# Patient Record
Sex: FEMALE | Race: WHITE | NOT HISPANIC OR LATINO | Employment: FULL TIME | URBAN - METROPOLITAN AREA
[De-identification: names, ages, dates, MRNs, and addresses within clinical notes are randomized per-mention and may not be internally consistent; named-entity substitution may affect disease eponyms.]

---

## 2020-07-21 ENCOUNTER — LAB VISIT (OUTPATIENT)
Dept: LAB | Facility: OTHER | Age: 45
End: 2020-07-21
Payer: OTHER GOVERNMENT

## 2020-07-21 DIAGNOSIS — Z20.822 SUSPECTED COVID-19 VIRUS INFECTION: ICD-10-CM

## 2020-07-21 DIAGNOSIS — Z03.818 ENCOUNTER FOR OBSERVATION FOR SUSPECTED EXPOSURE TO OTHER BIOLOGICAL AGENTS RULED OUT: ICD-10-CM

## 2020-07-21 PROCEDURE — U0003 INFECTIOUS AGENT DETECTION BY NUCLEIC ACID (DNA OR RNA); SEVERE ACUTE RESPIRATORY SYNDROME CORONAVIRUS 2 (SARS-COV-2) (CORONAVIRUS DISEASE [COVID-19]), AMPLIFIED PROBE TECHNIQUE, MAKING USE OF HIGH THROUGHPUT TECHNOLOGIES AS DESCRIBED BY CMS-2020-01-R: HCPCS

## 2020-07-24 LAB — SARS-COV-2 RNA RESP QL NAA+PROBE: NEGATIVE

## 2020-09-16 ENCOUNTER — LAB VISIT (OUTPATIENT)
Dept: LAB | Facility: OTHER | Age: 45
End: 2020-09-16
Payer: OTHER GOVERNMENT

## 2020-09-16 DIAGNOSIS — Z03.818 ENCOUNTER FOR OBSERVATION FOR SUSPECTED EXPOSURE TO OTHER BIOLOGICAL AGENTS RULED OUT: ICD-10-CM

## 2020-09-16 PROCEDURE — U0003 INFECTIOUS AGENT DETECTION BY NUCLEIC ACID (DNA OR RNA); SEVERE ACUTE RESPIRATORY SYNDROME CORONAVIRUS 2 (SARS-COV-2) (CORONAVIRUS DISEASE [COVID-19]), AMPLIFIED PROBE TECHNIQUE, MAKING USE OF HIGH THROUGHPUT TECHNOLOGIES AS DESCRIBED BY CMS-2020-01-R: HCPCS

## 2020-09-17 LAB — SARS-COV-2 RNA RESP QL NAA+PROBE: NOT DETECTED

## 2020-09-22 ENCOUNTER — LAB VISIT (OUTPATIENT)
Dept: LAB | Facility: OTHER | Age: 45
End: 2020-09-22
Payer: OTHER GOVERNMENT

## 2020-09-22 DIAGNOSIS — Z03.818 ENCOUNTER FOR OBSERVATION FOR SUSPECTED EXPOSURE TO OTHER BIOLOGICAL AGENTS RULED OUT: ICD-10-CM

## 2020-09-22 PROCEDURE — U0003 INFECTIOUS AGENT DETECTION BY NUCLEIC ACID (DNA OR RNA); SEVERE ACUTE RESPIRATORY SYNDROME CORONAVIRUS 2 (SARS-COV-2) (CORONAVIRUS DISEASE [COVID-19]), AMPLIFIED PROBE TECHNIQUE, MAKING USE OF HIGH THROUGHPUT TECHNOLOGIES AS DESCRIBED BY CMS-2020-01-R: HCPCS

## 2020-09-23 LAB — SARS-COV-2 RNA RESP QL NAA+PROBE: NOT DETECTED

## 2020-09-29 ENCOUNTER — LAB VISIT (OUTPATIENT)
Dept: LAB | Facility: OTHER | Age: 45
End: 2020-09-29
Payer: OTHER GOVERNMENT

## 2020-09-29 DIAGNOSIS — Z03.818 ENCOUNTER FOR OBSERVATION FOR SUSPECTED EXPOSURE TO OTHER BIOLOGICAL AGENTS RULED OUT: ICD-10-CM

## 2020-09-29 PROCEDURE — U0003 INFECTIOUS AGENT DETECTION BY NUCLEIC ACID (DNA OR RNA); SEVERE ACUTE RESPIRATORY SYNDROME CORONAVIRUS 2 (SARS-COV-2) (CORONAVIRUS DISEASE [COVID-19]), AMPLIFIED PROBE TECHNIQUE, MAKING USE OF HIGH THROUGHPUT TECHNOLOGIES AS DESCRIBED BY CMS-2020-01-R: HCPCS

## 2020-09-30 LAB — SARS-COV-2 RNA RESP QL NAA+PROBE: NOT DETECTED

## 2020-10-06 ENCOUNTER — LAB VISIT (OUTPATIENT)
Dept: LAB | Facility: OTHER | Age: 45
End: 2020-10-06
Payer: OTHER GOVERNMENT

## 2020-10-06 DIAGNOSIS — Z03.818 ENCOUNTER FOR OBSERVATION FOR SUSPECTED EXPOSURE TO OTHER BIOLOGICAL AGENTS RULED OUT: ICD-10-CM

## 2020-10-06 LAB — SARS-COV-2 RNA RESP QL NAA+PROBE: NOT DETECTED

## 2020-10-06 PROCEDURE — U0003 INFECTIOUS AGENT DETECTION BY NUCLEIC ACID (DNA OR RNA); SEVERE ACUTE RESPIRATORY SYNDROME CORONAVIRUS 2 (SARS-COV-2) (CORONAVIRUS DISEASE [COVID-19]), AMPLIFIED PROBE TECHNIQUE, MAKING USE OF HIGH THROUGHPUT TECHNOLOGIES AS DESCRIBED BY CMS-2020-01-R: HCPCS

## 2020-10-13 ENCOUNTER — LAB VISIT (OUTPATIENT)
Dept: LAB | Facility: OTHER | Age: 45
End: 2020-10-13
Payer: OTHER GOVERNMENT

## 2020-10-13 DIAGNOSIS — Z03.818 ENCOUNTER FOR OBSERVATION FOR SUSPECTED EXPOSURE TO OTHER BIOLOGICAL AGENTS RULED OUT: ICD-10-CM

## 2020-10-13 PROCEDURE — U0003 INFECTIOUS AGENT DETECTION BY NUCLEIC ACID (DNA OR RNA); SEVERE ACUTE RESPIRATORY SYNDROME CORONAVIRUS 2 (SARS-COV-2) (CORONAVIRUS DISEASE [COVID-19]), AMPLIFIED PROBE TECHNIQUE, MAKING USE OF HIGH THROUGHPUT TECHNOLOGIES AS DESCRIBED BY CMS-2020-01-R: HCPCS

## 2020-10-14 LAB — SARS-COV-2 RNA RESP QL NAA+PROBE: NOT DETECTED

## 2020-10-20 ENCOUNTER — LAB VISIT (OUTPATIENT)
Dept: LAB | Facility: OTHER | Age: 45
End: 2020-10-20
Payer: OTHER GOVERNMENT

## 2020-10-20 DIAGNOSIS — Z03.818 ENCOUNTER FOR OBSERVATION FOR SUSPECTED EXPOSURE TO OTHER BIOLOGICAL AGENTS RULED OUT: ICD-10-CM

## 2020-10-20 PROCEDURE — U0003 INFECTIOUS AGENT DETECTION BY NUCLEIC ACID (DNA OR RNA); SEVERE ACUTE RESPIRATORY SYNDROME CORONAVIRUS 2 (SARS-COV-2) (CORONAVIRUS DISEASE [COVID-19]), AMPLIFIED PROBE TECHNIQUE, MAKING USE OF HIGH THROUGHPUT TECHNOLOGIES AS DESCRIBED BY CMS-2020-01-R: HCPCS

## 2020-10-21 LAB — SARS-COV-2 RNA RESP QL NAA+PROBE: NOT DETECTED

## 2020-10-27 ENCOUNTER — LAB VISIT (OUTPATIENT)
Dept: LAB | Facility: OTHER | Age: 45
End: 2020-10-27
Payer: OTHER GOVERNMENT

## 2020-10-27 DIAGNOSIS — Z03.818 ENCOUNTER FOR OBSERVATION FOR SUSPECTED EXPOSURE TO OTHER BIOLOGICAL AGENTS RULED OUT: ICD-10-CM

## 2020-10-27 PROCEDURE — U0003 INFECTIOUS AGENT DETECTION BY NUCLEIC ACID (DNA OR RNA); SEVERE ACUTE RESPIRATORY SYNDROME CORONAVIRUS 2 (SARS-COV-2) (CORONAVIRUS DISEASE [COVID-19]), AMPLIFIED PROBE TECHNIQUE, MAKING USE OF HIGH THROUGHPUT TECHNOLOGIES AS DESCRIBED BY CMS-2020-01-R: HCPCS

## 2020-10-28 LAB — SARS-COV-2 RNA RESP QL NAA+PROBE: NOT DETECTED

## 2020-11-03 ENCOUNTER — LAB VISIT (OUTPATIENT)
Dept: LAB | Facility: OTHER | Age: 45
End: 2020-11-03
Payer: OTHER GOVERNMENT

## 2020-11-03 DIAGNOSIS — Z03.818 ENCOUNTER FOR OBSERVATION FOR SUSPECTED EXPOSURE TO OTHER BIOLOGICAL AGENTS RULED OUT: ICD-10-CM

## 2020-11-03 PROCEDURE — U0003 INFECTIOUS AGENT DETECTION BY NUCLEIC ACID (DNA OR RNA); SEVERE ACUTE RESPIRATORY SYNDROME CORONAVIRUS 2 (SARS-COV-2) (CORONAVIRUS DISEASE [COVID-19]), AMPLIFIED PROBE TECHNIQUE, MAKING USE OF HIGH THROUGHPUT TECHNOLOGIES AS DESCRIBED BY CMS-2020-01-R: HCPCS

## 2020-11-04 LAB — SARS-COV-2 RNA RESP QL NAA+PROBE: NOT DETECTED

## 2020-11-10 ENCOUNTER — LAB VISIT (OUTPATIENT)
Dept: LAB | Facility: OTHER | Age: 45
End: 2020-11-10
Payer: OTHER GOVERNMENT

## 2020-11-10 DIAGNOSIS — Z03.818 ENCOUNTER FOR OBSERVATION FOR SUSPECTED EXPOSURE TO OTHER BIOLOGICAL AGENTS RULED OUT: ICD-10-CM

## 2020-11-10 PROCEDURE — U0003 INFECTIOUS AGENT DETECTION BY NUCLEIC ACID (DNA OR RNA); SEVERE ACUTE RESPIRATORY SYNDROME CORONAVIRUS 2 (SARS-COV-2) (CORONAVIRUS DISEASE [COVID-19]), AMPLIFIED PROBE TECHNIQUE, MAKING USE OF HIGH THROUGHPUT TECHNOLOGIES AS DESCRIBED BY CMS-2020-01-R: HCPCS

## 2020-11-11 LAB — SARS-COV-2 RNA RESP QL NAA+PROBE: NOT DETECTED

## 2020-11-17 ENCOUNTER — LAB VISIT (OUTPATIENT)
Dept: LAB | Facility: OTHER | Age: 45
End: 2020-11-17
Payer: OTHER GOVERNMENT

## 2020-11-17 DIAGNOSIS — Z03.818 ENCOUNTER FOR OBSERVATION FOR SUSPECTED EXPOSURE TO OTHER BIOLOGICAL AGENTS RULED OUT: ICD-10-CM

## 2020-11-17 PROCEDURE — U0003 INFECTIOUS AGENT DETECTION BY NUCLEIC ACID (DNA OR RNA); SEVERE ACUTE RESPIRATORY SYNDROME CORONAVIRUS 2 (SARS-COV-2) (CORONAVIRUS DISEASE [COVID-19]), AMPLIFIED PROBE TECHNIQUE, MAKING USE OF HIGH THROUGHPUT TECHNOLOGIES AS DESCRIBED BY CMS-2020-01-R: HCPCS

## 2020-11-20 LAB — SARS-COV-2 RNA RESP QL NAA+PROBE: NOT DETECTED

## 2020-11-24 ENCOUNTER — LAB VISIT (OUTPATIENT)
Dept: LAB | Facility: OTHER | Age: 45
End: 2020-11-24
Payer: OTHER GOVERNMENT

## 2020-11-24 DIAGNOSIS — Z03.818 ENCOUNTER FOR OBSERVATION FOR SUSPECTED EXPOSURE TO OTHER BIOLOGICAL AGENTS RULED OUT: ICD-10-CM

## 2020-11-24 PROCEDURE — U0003 INFECTIOUS AGENT DETECTION BY NUCLEIC ACID (DNA OR RNA); SEVERE ACUTE RESPIRATORY SYNDROME CORONAVIRUS 2 (SARS-COV-2) (CORONAVIRUS DISEASE [COVID-19]), AMPLIFIED PROBE TECHNIQUE, MAKING USE OF HIGH THROUGHPUT TECHNOLOGIES AS DESCRIBED BY CMS-2020-01-R: HCPCS

## 2020-11-25 ENCOUNTER — TELEPHONE (OUTPATIENT)
Dept: PRIMARY CARE CLINIC | Facility: OTHER | Age: 45
End: 2020-11-25

## 2020-11-25 LAB — SARS-COV-2 RNA RESP QL NAA+PROBE: NOT DETECTED

## 2020-12-01 ENCOUNTER — LAB VISIT (OUTPATIENT)
Dept: LAB | Facility: OTHER | Age: 45
End: 2020-12-01
Payer: OTHER GOVERNMENT

## 2020-12-01 DIAGNOSIS — Z03.818 ENCOUNTER FOR OBSERVATION FOR SUSPECTED EXPOSURE TO OTHER BIOLOGICAL AGENTS RULED OUT: ICD-10-CM

## 2020-12-01 PROCEDURE — U0003 INFECTIOUS AGENT DETECTION BY NUCLEIC ACID (DNA OR RNA); SEVERE ACUTE RESPIRATORY SYNDROME CORONAVIRUS 2 (SARS-COV-2) (CORONAVIRUS DISEASE [COVID-19]), AMPLIFIED PROBE TECHNIQUE, MAKING USE OF HIGH THROUGHPUT TECHNOLOGIES AS DESCRIBED BY CMS-2020-01-R: HCPCS

## 2020-12-02 LAB — SARS-COV-2 RNA RESP QL NAA+PROBE: NOT DETECTED

## 2020-12-11 ENCOUNTER — LAB VISIT (OUTPATIENT)
Dept: LAB | Facility: OTHER | Age: 45
End: 2020-12-11
Payer: OTHER GOVERNMENT

## 2020-12-11 DIAGNOSIS — Z03.818 ENCOUNTER FOR OBSERVATION FOR SUSPECTED EXPOSURE TO OTHER BIOLOGICAL AGENTS RULED OUT: ICD-10-CM

## 2020-12-11 PROCEDURE — U0003 INFECTIOUS AGENT DETECTION BY NUCLEIC ACID (DNA OR RNA); SEVERE ACUTE RESPIRATORY SYNDROME CORONAVIRUS 2 (SARS-COV-2) (CORONAVIRUS DISEASE [COVID-19]), AMPLIFIED PROBE TECHNIQUE, MAKING USE OF HIGH THROUGHPUT TECHNOLOGIES AS DESCRIBED BY CMS-2020-01-R: HCPCS

## 2020-12-13 LAB — SARS-COV-2 RNA RESP QL NAA+PROBE: NOT DETECTED

## 2020-12-18 ENCOUNTER — LAB VISIT (OUTPATIENT)
Dept: LAB | Facility: OTHER | Age: 45
End: 2020-12-18
Payer: OTHER GOVERNMENT

## 2020-12-18 DIAGNOSIS — Z03.818 ENCOUNTER FOR OBSERVATION FOR SUSPECTED EXPOSURE TO OTHER BIOLOGICAL AGENTS RULED OUT: ICD-10-CM

## 2020-12-18 PROCEDURE — U0003 INFECTIOUS AGENT DETECTION BY NUCLEIC ACID (DNA OR RNA); SEVERE ACUTE RESPIRATORY SYNDROME CORONAVIRUS 2 (SARS-COV-2) (CORONAVIRUS DISEASE [COVID-19]), AMPLIFIED PROBE TECHNIQUE, MAKING USE OF HIGH THROUGHPUT TECHNOLOGIES AS DESCRIBED BY CMS-2020-01-R: HCPCS

## 2020-12-20 LAB — SARS-COV-2 RNA RESP QL NAA+PROBE: NOT DETECTED

## 2021-01-05 ENCOUNTER — LAB VISIT (OUTPATIENT)
Dept: LAB | Facility: OTHER | Age: 46
End: 2021-01-05
Payer: OTHER GOVERNMENT

## 2021-01-05 DIAGNOSIS — Z03.818 ENCOUNTER FOR OBSERVATION FOR SUSPECTED EXPOSURE TO OTHER BIOLOGICAL AGENTS RULED OUT: ICD-10-CM

## 2021-01-05 PROCEDURE — U0003 INFECTIOUS AGENT DETECTION BY NUCLEIC ACID (DNA OR RNA); SEVERE ACUTE RESPIRATORY SYNDROME CORONAVIRUS 2 (SARS-COV-2) (CORONAVIRUS DISEASE [COVID-19]), AMPLIFIED PROBE TECHNIQUE, MAKING USE OF HIGH THROUGHPUT TECHNOLOGIES AS DESCRIBED BY CMS-2020-01-R: HCPCS

## 2021-01-06 LAB — SARS-COV-2 RNA RESP QL NAA+PROBE: NOT DETECTED

## 2021-01-19 ENCOUNTER — LAB VISIT (OUTPATIENT)
Dept: LAB | Facility: OTHER | Age: 46
End: 2021-01-19
Payer: OTHER GOVERNMENT

## 2021-01-19 DIAGNOSIS — Z20.822 ENCOUNTER FOR LABORATORY TESTING FOR COVID-19 VIRUS: ICD-10-CM

## 2021-01-19 PROCEDURE — U0003 INFECTIOUS AGENT DETECTION BY NUCLEIC ACID (DNA OR RNA); SEVERE ACUTE RESPIRATORY SYNDROME CORONAVIRUS 2 (SARS-COV-2) (CORONAVIRUS DISEASE [COVID-19]), AMPLIFIED PROBE TECHNIQUE, MAKING USE OF HIGH THROUGHPUT TECHNOLOGIES AS DESCRIBED BY CMS-2020-01-R: HCPCS

## 2021-01-20 LAB — SARS-COV-2 RNA RESP QL NAA+PROBE: NOT DETECTED

## 2021-07-01 ENCOUNTER — PATIENT MESSAGE (OUTPATIENT)
Dept: ADMINISTRATIVE | Facility: OTHER | Age: 46
End: 2021-07-01

## 2024-03-22 ENCOUNTER — OCCUPATIONAL HEALTH (OUTPATIENT)
Dept: URGENT CARE | Facility: CLINIC | Age: 49
End: 2024-03-22

## 2024-03-22 DIAGNOSIS — Z00.00 ROUTINE GENERAL MEDICAL EXAMINATION AT A HEALTH CARE FACILITY: Primary | ICD-10-CM

## 2024-03-22 PROCEDURE — 80305 DRUG TEST PRSMV DIR OPT OBS: CPT | Mod: S$GLB,,, | Performed by: EMERGENCY MEDICINE

## 2024-03-25 PROBLEM — S62.615G: Status: ACTIVE | Noted: 2024-03-25

## 2024-05-19 ENCOUNTER — HOSPITAL ENCOUNTER (EMERGENCY)
Facility: HOSPITAL | Age: 49
Discharge: HOME | End: 2024-05-19
Attending: STUDENT IN AN ORGANIZED HEALTH CARE EDUCATION/TRAINING PROGRAM

## 2024-05-19 VITALS
TEMPERATURE: 98 F | OXYGEN SATURATION: 98 % | RESPIRATION RATE: 22 BRPM | SYSTOLIC BLOOD PRESSURE: 138 MMHG | DIASTOLIC BLOOD PRESSURE: 78 MMHG | HEART RATE: 80 BPM

## 2024-05-19 DIAGNOSIS — R07.9 CHEST PAIN: ICD-10-CM

## 2024-05-19 DIAGNOSIS — I47.10 SVT (SUPRAVENTRICULAR TACHYCARDIA): ICD-10-CM

## 2024-05-19 DIAGNOSIS — R00.0 TACHYCARDIA: ICD-10-CM

## 2024-05-19 LAB
ALBUMIN SERPL BCP-MCNC: 5.2 G/DL (ref 3.5–5.2)
ALP SERPL-CCNC: 70 U/L (ref 55–135)
ALT SERPL W/O P-5'-P-CCNC: 36 U/L (ref 10–44)
ANION GAP SERPL CALC-SCNC: 11 MMOL/L (ref 8–16)
AST SERPL-CCNC: 35 U/L (ref 10–40)
BASOPHILS # BLD AUTO: 0.06 K/UL (ref 0–0.2)
BASOPHILS NFR BLD: 0.5 % (ref 0–1.9)
BILIRUB SERPL-MCNC: 0.5 MG/DL (ref 0.1–1)
BNP SERPL-MCNC: 122 PG/ML (ref 0–99)
BUN SERPL-MCNC: 17 MG/DL (ref 6–20)
CALCIUM SERPL-MCNC: 10.2 MG/DL (ref 8.7–10.5)
CHLORIDE SERPL-SCNC: 97 MMOL/L (ref 95–110)
CO2 SERPL-SCNC: 26 MMOL/L (ref 23–29)
CREAT SERPL-MCNC: 1.2 MG/DL (ref 0.5–1.4)
DIFFERENTIAL METHOD BLD: NORMAL
EOSINOPHIL # BLD AUTO: 0.1 K/UL (ref 0–0.5)
EOSINOPHIL NFR BLD: 0.5 % (ref 0–8)
ERYTHROCYTE [DISTWIDTH] IN BLOOD BY AUTOMATED COUNT: 13.3 % (ref 11.5–14.5)
EST. GFR  (NO RACE VARIABLE): 55.1 ML/MIN/1.73 M^2
GLUCOSE SERPL-MCNC: 140 MG/DL (ref 70–110)
HCT VFR BLD AUTO: 46.8 % (ref 37–48.5)
HGB BLD-MCNC: 15.1 G/DL (ref 12–16)
IMM GRANULOCYTES # BLD AUTO: 0.03 K/UL (ref 0–0.04)
IMM GRANULOCYTES NFR BLD AUTO: 0.3 % (ref 0–0.5)
INR PPP: 1 (ref 0.8–1.2)
LYMPHOCYTES # BLD AUTO: 3.4 K/UL (ref 1–4.8)
LYMPHOCYTES NFR BLD: 29.3 % (ref 18–48)
MAGNESIUM SERPL-MCNC: 1.8 MG/DL (ref 1.6–2.6)
MCH RBC QN AUTO: 30.8 PG (ref 27–31)
MCHC RBC AUTO-ENTMCNC: 32.3 G/DL (ref 32–36)
MCV RBC AUTO: 96 FL (ref 82–98)
MONOCYTES # BLD AUTO: 0.7 K/UL (ref 0.3–1)
MONOCYTES NFR BLD: 6.5 % (ref 4–15)
NEUTROPHILS # BLD AUTO: 7.2 K/UL (ref 1.8–7.7)
NEUTROPHILS NFR BLD: 62.9 % (ref 38–73)
NRBC BLD-RTO: 0 /100 WBC
PLATELET # BLD AUTO: 258 K/UL (ref 150–450)
PMV BLD AUTO: 10.3 FL (ref 9.2–12.9)
POTASSIUM SERPL-SCNC: 4.7 MMOL/L (ref 3.5–5.1)
PROT SERPL-MCNC: 8.6 G/DL (ref 6–8.4)
PROTHROMBIN TIME: 10.9 SEC (ref 9–12.5)
RBC # BLD AUTO: 4.9 M/UL (ref 4–5.4)
SODIUM SERPL-SCNC: 134 MMOL/L (ref 136–145)
T4 FREE SERPL-MCNC: 1.43 NG/DL (ref 0.71–1.51)
TROPONIN I SERPL HS-MCNC: 3.8 PG/ML (ref 0–14.9)
TSH SERPL DL<=0.005 MIU/L-ACNC: 13.67 UIU/ML (ref 0.34–5.6)
WBC # BLD AUTO: 11.46 K/UL (ref 3.9–12.7)

## 2024-05-19 PROCEDURE — 99284 EMERGENCY DEPT VISIT MOD MDM: CPT

## 2024-05-19 PROCEDURE — 85025 COMPLETE CBC W/AUTO DIFF WBC: CPT | Performed by: EMERGENCY MEDICINE

## 2024-05-19 PROCEDURE — 83735 ASSAY OF MAGNESIUM: CPT | Performed by: EMERGENCY MEDICINE

## 2024-05-19 PROCEDURE — 83880 ASSAY OF NATRIURETIC PEPTIDE: CPT | Performed by: EMERGENCY MEDICINE

## 2024-05-19 PROCEDURE — 85610 PROTHROMBIN TIME: CPT | Performed by: EMERGENCY MEDICINE

## 2024-05-19 PROCEDURE — 84484 ASSAY OF TROPONIN QUANT: CPT | Performed by: EMERGENCY MEDICINE

## 2024-05-19 PROCEDURE — 84443 ASSAY THYROID STIM HORMONE: CPT | Performed by: EMERGENCY MEDICINE

## 2024-05-19 PROCEDURE — 84439 ASSAY OF FREE THYROXINE: CPT | Performed by: EMERGENCY MEDICINE

## 2024-05-19 PROCEDURE — 80053 COMPREHEN METABOLIC PANEL: CPT | Performed by: EMERGENCY MEDICINE

## 2024-05-19 RX ORDER — ADENOSINE 3 MG/ML
INJECTION, SOLUTION INTRAVENOUS
Status: DISCONTINUED
Start: 2024-05-19 | End: 2024-05-19 | Stop reason: HOSPADM

## 2024-05-19 NOTE — ED NOTES
"Pt presented stating she was in SVT.  Pt refused wheelchair and was brought to TR1.  EKG revealed SVT and MD at bedside. Patient required an ultrasound IV.  2 doses of adenosine given and patient went into NSR.  After her repeat she refused the CXR.  Patient was disconnecting herself off the monitor and was able to convince her to at least leave the cardiac monitor on until we get her lab work back. Patient states she "is a bad patient".  I told her she was fine and we would take food care of her.  "

## 2024-05-19 NOTE — DISCHARGE INSTRUCTIONS
Please keep your appointment with your electrophysiologist and follow up with the primary care doctor as well.  Please continue your metoprolol and diltiazem.  With any return of symptoms or any concerns please return to the ER immediately

## 2024-05-19 NOTE — ED PROVIDER NOTES
Encounter Date: 5/19/2024       History     Chief Complaint   Patient presents with    Palpitations     HPI  49-year-old presenting with palpitations, heart racing sensation.  History of SVT.  Reports that she was gone into SVT multiple times.  Has not been able to find a trigger.  Does not use caffeine.  Was at work.  She says that she noticed her symptoms and recognized that she was likely an SVT and therefore tried multiple vagal maneuvers but was unsuccessful.  Reports that they have sometimes worked in the past but recently they have not been working.  Drove herself here because she says that she wants to go back to work in his scared as she started a new job and does not want to miss any work if she was not have to.  Reports that she had wanted to forego an ablation but at this point as she was had multiple episodes this SVT has made an appointment with an electrophysiologist coming up.  Has been taking all her medications including her thyroid medication.  Reports mild chest discomfort due to feeling her heart racing and shortness of breath and dizziness.  Review of patient's allergies indicates:   Allergen Reactions    Nuvigil [armodafinil] Hives    Phenergan plain Anxiety     Past Medical History:   Diagnosis Date    GE reflux     Hypertension     SVT (supraventricular tachycardia)     Thyroid disease      Past Surgical History:   Procedure Laterality Date    AUGMENTATION OF BREAST      BILATERAL TUBAL LIGATION      OPEN REDUCTION AND INTERNAL FIXATION (ORIF) OF INJURY OF FINGER Left 3/25/2024    Procedure: ORIF, FINGER ring finger;  Surgeon: Dejuan Munguia MD;  Location: [facility];  Service: Orthopedics;  Laterality: Left;  Proximal Phalanx     Family History   Problem Relation Name Age of Onset    Cancer Mother          kidney     Social History     Tobacco Use    Smoking status: Every Day     Types: Vaping with nicotine    Smokeless tobacco: Never   Substance Use Topics    Alcohol use: Yes      Comment: occasional    Drug use: Never     Review of Systems   Constitutional:  Negative for chills and fever.   HENT:  Negative for congestion and sore throat.    Eyes:  Negative for redness and visual disturbance.   Respiratory:  Positive for shortness of breath. Negative for cough.    Cardiovascular:  Positive for chest pain. Negative for palpitations and leg swelling.   Gastrointestinal:  Negative for abdominal pain, blood in stool, constipation, diarrhea, nausea and vomiting.   Genitourinary:  Negative for dysuria, frequency and hematuria.   Musculoskeletal:  Negative for back pain, joint swelling, neck pain and neck stiffness.   Skin:  Negative for rash and wound.   Neurological:  Positive for dizziness. Negative for weakness and numbness.   Psychiatric/Behavioral:  Negative for confusion.        Physical Exam     Initial Vitals   BP Pulse Resp Temp SpO2   05/19/24 1106 05/19/24 1106 05/19/24 1104 05/19/24 1215 05/19/24 1106   (!) 140/99 (!) 155 (!) 31 98 °F (36.7 °C) 100 %      MAP       --                Physical Exam    Nursing note and vitals reviewed.  Constitutional: She appears well-developed. She is not diaphoretic.   HENT:   Head: Normocephalic.   Eyes: Right eye exhibits no discharge. Left eye exhibits no discharge. No scleral icterus.   Neck: Neck supple. No tracheal deviation present.   Cardiovascular:            SVT in the 140   Pulmonary/Chest: Breath sounds normal. No stridor. No respiratory distress. She has no wheezes. She has no rhonchi. She has no rales.   Abdominal: Abdomen is soft. She exhibits no distension. There is no abdominal tenderness. There is no rebound and no guarding.   Musculoskeletal:         General: No edema.      Cervical back: Neck supple.     Neurological: She is alert and oriented to person, place, and time.   Skin: Skin is warm and dry.         ED Course   Procedures  Labs Reviewed   COMPREHENSIVE METABOLIC PANEL - Abnormal; Notable for the following components:        Result Value    Sodium 134 (*)     Glucose 140 (*)     Total Protein 8.6 (*)     eGFR 55.1 (*)     All other components within normal limits   B-TYPE NATRIURETIC PEPTIDE - Abnormal; Notable for the following components:     (*)     All other components within normal limits   TSH - Abnormal; Notable for the following components:    TSH 13.671 (*)     All other components within normal limits   CBC W/ AUTO DIFFERENTIAL   MAGNESIUM   TROPONIN I HIGH SENSITIVITY   PROTIME-INR   TSH   T4, FREE        ECG Results              EKG 12-lead (In process)        Collection Time Result Time QRS Duration OHS QTC Calculation    05/19/24 12:08:59 05/19/24 12:29:59 92 453                     In process by Interface, Lab In [facility] (05/19/24 12:30:03)                   Narrative:    Test Reason : R00.0,    Vent. Rate : 081 BPM     Atrial Rate : 081 BPM     P-R Int : 164 ms          QRS Dur : 092 ms      QT Int : 390 ms       P-R-T Axes : 069 -08 050 degrees     QTc Int : 453 ms    Normal sinus rhythm  Incomplete right bundle branch block  Borderline Abnormal ECG  When compared with ECG of 19-MAY-2024 11:32,  PREVIOUS ECG IS PRESENT    Referred By: [email or web address]   SELF           Confirmed By:                                      EKG 12-lead (In process)        Collection Time Result Time QRS Duration OHS QTC Calculation    05/19/24 11:06:40 05/19/24 11:57:56 90 477                     In process by Interface, Lab In [facility] (05/19/24 11:58:06)                   Narrative:    Test Reason : R07.9,    Vent. Rate : 154 BPM     Atrial Rate : 000 BPM     P-R Int : 000 ms          QRS Dur : 090 ms      QT Int : 298 ms       P-R-T Axes : 000 -11 080 degrees     QTc Int : 477 ms    Supraventricular tachycardia  Right ventricular conduction delay  Nonspecific ST and T wave abnormality  Abnormal ECG  No previous ECGs available    Referred By: [email or web address]   SELF           Confirmed By:                                   Imaging Results    None           Medications - No data to display    Medical Decision Making  Amount and/or Complexity of Data Reviewed  Labs: ordered.    On my independent interpretation labs CBC, CMP, coags, Mag, troponin, TSH, BNP, free T4, EKG, within acceptable limits except for .  Patient declined chest x-ray    On my independent interpretation EKG x2  with rate of 140's, in svt. No signs of acute occlusion mi. Repeat EKG post adenosine nsr hr of 80's     Stable SVT.  Vagal maneuvers unsuccessful.  6 mg it does unsuccessful.  12 mg adenosine successful.  Cardiac workup within acceptable limits.  Patient back to baseline.  Has follow up with primary care doctor and electrophysiologist.    Strict return precautions discussed    Eda Child MD  Emergency Medicine Staff Physician                                    Clinical Impression:  Final diagnoses:  [R07.9] Chest pain  [I47.10] SVT (supraventricular tachycardia)  [R00.0] Tachycardia          ED Disposition Condition    Discharge Stable          ED Prescriptions    None       Follow-up Information       Follow up With Specialties Details Why Contact Info Additional Information    [facility] - Emergency Dept Emergency Medicine Go to  Return to an emergency department immediately if you develop persisting or worsening symptoms or with any new symptoms such as fevers, chills, inability to eat or drink, uncontrollable pain, headaches, chagnes in vision, nausea, vomiting, stomach pain [address] [facility] [id number] [phone] 1st floor             Eda Child MD  05/21/24 1781

## 2024-06-08 LAB
OHS QRS DURATION: 90 MS
OHS QRS DURATION: 92 MS
OHS QTC CALCULATION: 453 MS
OHS QTC CALCULATION: 477 MS

## 2024-07-05 ENCOUNTER — HOSPITAL ENCOUNTER (INPATIENT)
Facility: HOSPITAL | Age: 49
LOS: 1 days | Discharge: HOME | DRG: 439 | End: 2024-07-06
Attending: EMERGENCY MEDICINE | Admitting: INTERNAL MEDICINE
Payer: COMMERCIAL

## 2024-07-05 DIAGNOSIS — N17.9 AKI (ACUTE KIDNEY INJURY): Primary | ICD-10-CM

## 2024-07-05 DIAGNOSIS — R11.2 NAUSEA AND VOMITING, UNSPECIFIED VOMITING TYPE: ICD-10-CM

## 2024-07-05 DIAGNOSIS — R07.9 CHEST PAIN: ICD-10-CM

## 2024-07-05 LAB
ALBUMIN SERPL BCP-MCNC: 5.4 G/DL (ref 3.5–5.2)
ALP SERPL-CCNC: 87 U/L (ref 55–135)
ALT SERPL W/O P-5'-P-CCNC: 80 U/L (ref 10–44)
ANION GAP SERPL CALC-SCNC: 12 MMOL/L (ref 8–16)
AST SERPL-CCNC: 59 U/L (ref 10–40)
BASOPHILS # BLD AUTO: 0.06 K/UL (ref 0–0.2)
BASOPHILS NFR BLD: 0.3 % (ref 0–1.9)
BILIRUB SERPL-MCNC: 0.7 MG/DL (ref 0.1–1)
BUN SERPL-MCNC: 25 MG/DL (ref 6–20)
CALCIUM SERPL-MCNC: 10.6 MG/DL (ref 8.7–10.5)
CHLORIDE SERPL-SCNC: 93 MMOL/L (ref 95–110)
CK SERPL-CCNC: 86 U/L (ref 20–180)
CO2 SERPL-SCNC: 27 MMOL/L (ref 23–29)
CREAT SERPL-MCNC: 2 MG/DL (ref 0.5–1.4)
DIFFERENTIAL METHOD BLD: ABNORMAL
EOSINOPHIL # BLD AUTO: 0 K/UL (ref 0–0.5)
EOSINOPHIL NFR BLD: 0 % (ref 0–8)
ERYTHROCYTE [DISTWIDTH] IN BLOOD BY AUTOMATED COUNT: 13.6 % (ref 11.5–14.5)
EST. GFR  (NO RACE VARIABLE): 29.9 ML/MIN/1.73 M^2
GLUCOSE SERPL-MCNC: 112 MG/DL (ref 70–110)
HCT VFR BLD AUTO: 49.7 % (ref 37–48.5)
HGB BLD-MCNC: 17.1 G/DL (ref 12–16)
IMM GRANULOCYTES # BLD AUTO: 0.07 K/UL (ref 0–0.04)
IMM GRANULOCYTES NFR BLD AUTO: 0.4 % (ref 0–0.5)
LIPASE SERPL-CCNC: 153 U/L (ref 4–60)
LYMPHOCYTES # BLD AUTO: 2.1 K/UL (ref 1–4.8)
LYMPHOCYTES NFR BLD: 10.8 % (ref 18–48)
MCH RBC QN AUTO: 31.9 PG (ref 27–31)
MCHC RBC AUTO-ENTMCNC: 34.4 G/DL (ref 32–36)
MCV RBC AUTO: 93 FL (ref 82–98)
MONOCYTES # BLD AUTO: 1.7 K/UL (ref 0.3–1)
MONOCYTES NFR BLD: 8.6 % (ref 4–15)
NEUTROPHILS # BLD AUTO: 15.3 K/UL (ref 1.8–7.7)
NEUTROPHILS NFR BLD: 79.9 % (ref 38–73)
NRBC BLD-RTO: 0 /100 WBC
PLATELET # BLD AUTO: 328 K/UL (ref 150–450)
PMV BLD AUTO: 10 FL (ref 9.2–12.9)
POTASSIUM SERPL-SCNC: 5.6 MMOL/L (ref 3.5–5.1)
PROT SERPL-MCNC: 8.9 G/DL (ref 6–8.4)
RBC # BLD AUTO: 5.36 M/UL (ref 4–5.4)
SODIUM SERPL-SCNC: 132 MMOL/L (ref 136–145)
T4 FREE SERPL-MCNC: 1.01 NG/DL (ref 0.71–1.51)
TROPONIN I SERPL HS-MCNC: 5.7 PG/ML (ref 0–14.9)
TSH SERPL DL<=0.005 MIU/L-ACNC: 20.31 UIU/ML (ref 0.34–5.6)
WBC # BLD AUTO: 19.15 K/UL (ref 3.9–12.7)

## 2024-07-05 PROCEDURE — 85025 COMPLETE CBC W/AUTO DIFF WBC: CPT | Performed by: NURSE PRACTITIONER

## 2024-07-05 PROCEDURE — 84443 ASSAY THYROID STIM HORMONE: CPT | Performed by: NURSE PRACTITIONER

## 2024-07-05 PROCEDURE — 96376 TX/PRO/DX INJ SAME DRUG ADON: CPT

## 2024-07-05 PROCEDURE — 25000003 PHARM REV CODE 250: Performed by: NURSE PRACTITIONER

## 2024-07-05 PROCEDURE — 80053 COMPREHEN METABOLIC PANEL: CPT | Performed by: NURSE PRACTITIONER

## 2024-07-05 PROCEDURE — 84439 ASSAY OF FREE THYROXINE: CPT | Performed by: NURSE PRACTITIONER

## 2024-07-05 PROCEDURE — 96375 TX/PRO/DX INJ NEW DRUG ADDON: CPT

## 2024-07-05 PROCEDURE — 93005 ELECTROCARDIOGRAM TRACING: CPT | Performed by: GENERAL PRACTICE

## 2024-07-05 PROCEDURE — 36415 COLL VENOUS BLD VENIPUNCTURE: CPT | Performed by: EMERGENCY MEDICINE

## 2024-07-05 PROCEDURE — 84484 ASSAY OF TROPONIN QUANT: CPT | Mod: 91 | Performed by: EMERGENCY MEDICINE

## 2024-07-05 PROCEDURE — 25000003 PHARM REV CODE 250: Performed by: STUDENT IN AN ORGANIZED HEALTH CARE EDUCATION/TRAINING PROGRAM

## 2024-07-05 PROCEDURE — 96361 HYDRATE IV INFUSION ADD-ON: CPT

## 2024-07-05 PROCEDURE — 84484 ASSAY OF TROPONIN QUANT: CPT | Performed by: NURSE PRACTITIONER

## 2024-07-05 PROCEDURE — 99285 EMERGENCY DEPT VISIT HI MDM: CPT | Mod: 25

## 2024-07-05 PROCEDURE — 82550 ASSAY OF CK (CPK): CPT | Performed by: NURSE PRACTITIONER

## 2024-07-05 PROCEDURE — 63600175 PHARM REV CODE 636 W HCPCS: Performed by: STUDENT IN AN ORGANIZED HEALTH CARE EDUCATION/TRAINING PROGRAM

## 2024-07-05 PROCEDURE — 93010 ELECTROCARDIOGRAM REPORT: CPT | Mod: ,,, | Performed by: GENERAL PRACTICE

## 2024-07-05 PROCEDURE — 83690 ASSAY OF LIPASE: CPT | Performed by: EMERGENCY MEDICINE

## 2024-07-05 RX ORDER — ONDANSETRON HYDROCHLORIDE 2 MG/ML
6 INJECTION, SOLUTION INTRAVENOUS
Status: COMPLETED | OUTPATIENT
Start: 2024-07-05 | End: 2024-07-05

## 2024-07-05 RX ORDER — FAMOTIDINE 10 MG/ML
20 INJECTION INTRAVENOUS
Status: COMPLETED | OUTPATIENT
Start: 2024-07-05 | End: 2024-07-05

## 2024-07-05 RX ORDER — ONDANSETRON HYDROCHLORIDE 4 MG/5ML
2 SOLUTION ORAL
Status: COMPLETED | OUTPATIENT
Start: 2024-07-05 | End: 2024-07-05

## 2024-07-05 RX ADMIN — ONDANSETRON 6 MG: 2 INJECTION INTRAMUSCULAR; INTRAVENOUS at 10:07

## 2024-07-05 RX ADMIN — SODIUM CHLORIDE 1000 ML: 9 INJECTION, SOLUTION INTRAVENOUS at 09:07

## 2024-07-05 RX ADMIN — FAMOTIDINE 20 MG: 10 INJECTION INTRAVENOUS at 10:07

## 2024-07-05 RX ADMIN — ONDANSETRON 2 MG: 4 SOLUTION ORAL at 09:07

## 2024-07-06 VITALS
TEMPERATURE: 99 F | OXYGEN SATURATION: 99 % | RESPIRATION RATE: 20 BRPM | BODY MASS INDEX: 24.33 KG/M2 | HEIGHT: 67 IN | HEART RATE: 83 BPM | DIASTOLIC BLOOD PRESSURE: 69 MMHG | WEIGHT: 155 LBS | SYSTOLIC BLOOD PRESSURE: 132 MMHG

## 2024-07-06 PROBLEM — R74.01 TRANSAMINITIS: Status: ACTIVE | Noted: 2024-07-06

## 2024-07-06 PROBLEM — E03.9 HYPOTHYROIDISM: Status: ACTIVE | Noted: 2024-07-06

## 2024-07-06 PROBLEM — B17.9 ACUTE HEPATITIS: Status: ACTIVE | Noted: 2024-07-06

## 2024-07-06 PROBLEM — I10 ESSENTIAL HYPERTENSION: Status: ACTIVE | Noted: 2024-07-06

## 2024-07-06 PROBLEM — E87.5 HYPERKALEMIA: Status: ACTIVE | Noted: 2024-07-06

## 2024-07-06 PROBLEM — K85.90 ACUTE PANCREATITIS: Status: ACTIVE | Noted: 2024-07-06

## 2024-07-06 PROBLEM — N17.9 AKI (ACUTE KIDNEY INJURY): Status: ACTIVE | Noted: 2024-07-06

## 2024-07-06 LAB
ALBUMIN SERPL BCP-MCNC: 4.3 G/DL (ref 3.5–5.2)
ALP SERPL-CCNC: 69 U/L (ref 55–135)
ALT SERPL W/O P-5'-P-CCNC: 55 U/L (ref 10–44)
ANION GAP SERPL CALC-SCNC: 9 MMOL/L (ref 8–16)
AST SERPL-CCNC: 35 U/L (ref 10–40)
BASOPHILS # BLD AUTO: 0.03 K/UL (ref 0–0.2)
BASOPHILS NFR BLD: 0.4 % (ref 0–1.9)
BILIRUB SERPL-MCNC: 1.1 MG/DL (ref 0.1–1)
BUN SERPL-MCNC: 18 MG/DL (ref 6–20)
CALCIUM SERPL-MCNC: 9.1 MG/DL (ref 8.7–10.5)
CHLORIDE SERPL-SCNC: 101 MMOL/L (ref 95–110)
CO2 SERPL-SCNC: 26 MMOL/L (ref 23–29)
CREAT SERPL-MCNC: 1.2 MG/DL (ref 0.5–1.4)
DIFFERENTIAL METHOD BLD: ABNORMAL
EOSINOPHIL # BLD AUTO: 0 K/UL (ref 0–0.5)
EOSINOPHIL NFR BLD: 0.4 % (ref 0–8)
ERYTHROCYTE [DISTWIDTH] IN BLOOD BY AUTOMATED COUNT: 13.7 % (ref 11.5–14.5)
EST. GFR  (NO RACE VARIABLE): 55.1 ML/MIN/1.73 M^2
GLUCOSE SERPL-MCNC: 95 MG/DL (ref 70–110)
HCT VFR BLD AUTO: 42.6 % (ref 37–48.5)
HGB BLD-MCNC: 14.4 G/DL (ref 12–16)
IMM GRANULOCYTES # BLD AUTO: 0.03 K/UL (ref 0–0.04)
IMM GRANULOCYTES NFR BLD AUTO: 0.4 % (ref 0–0.5)
LIPASE SERPL-CCNC: 136 U/L (ref 4–60)
LYMPHOCYTES # BLD AUTO: 1.6 K/UL (ref 1–4.8)
LYMPHOCYTES NFR BLD: 19 % (ref 18–48)
MAGNESIUM SERPL-MCNC: 1.7 MG/DL (ref 1.6–2.6)
MCH RBC QN AUTO: 31.9 PG (ref 27–31)
MCHC RBC AUTO-ENTMCNC: 33.8 G/DL (ref 32–36)
MCV RBC AUTO: 95 FL (ref 82–98)
MONOCYTES # BLD AUTO: 0.8 K/UL (ref 0.3–1)
MONOCYTES NFR BLD: 9.6 % (ref 4–15)
NEUTROPHILS # BLD AUTO: 5.8 K/UL (ref 1.8–7.7)
NEUTROPHILS NFR BLD: 70.2 % (ref 38–73)
NRBC BLD-RTO: 0 /100 WBC
PLATELET # BLD AUTO: 227 K/UL (ref 150–450)
PMV BLD AUTO: 9.9 FL (ref 9.2–12.9)
POTASSIUM SERPL-SCNC: 4.2 MMOL/L (ref 3.5–5.1)
PROCALCITONIN SERPL IA-MCNC: 0.06 NG/ML (ref 0–0.5)
PROT SERPL-MCNC: 6.9 G/DL (ref 6–8.4)
RBC # BLD AUTO: 4.51 M/UL (ref 4–5.4)
SODIUM SERPL-SCNC: 136 MMOL/L (ref 136–145)
T4 FREE SERPL-MCNC: 1.1 NG/DL (ref 0.71–1.51)
TRIGL SERPL-MCNC: 151 MG/DL (ref 30–150)
TROPONIN I SERPL HS-MCNC: 5.3 PG/ML (ref 0–14.9)
TSH SERPL DL<=0.005 MIU/L-ACNC: 12.37 UIU/ML (ref 0.34–5.6)
WBC # BLD AUTO: 8.21 K/UL (ref 3.9–12.7)

## 2024-07-06 PROCEDURE — 85025 COMPLETE CBC W/AUTO DIFF WBC: CPT | Performed by: INTERNAL MEDICINE

## 2024-07-06 PROCEDURE — 84443 ASSAY THYROID STIM HORMONE: CPT | Performed by: NURSE PRACTITIONER

## 2024-07-06 PROCEDURE — 25000003 PHARM REV CODE 250: Performed by: STUDENT IN AN ORGANIZED HEALTH CARE EDUCATION/TRAINING PROGRAM

## 2024-07-06 PROCEDURE — 84145 PROCALCITONIN (PCT): CPT | Performed by: NURSE PRACTITIONER

## 2024-07-06 PROCEDURE — 84439 ASSAY OF FREE THYROXINE: CPT | Performed by: NURSE PRACTITIONER

## 2024-07-06 PROCEDURE — 80074 ACUTE HEPATITIS PANEL: CPT | Performed by: INTERNAL MEDICINE

## 2024-07-06 PROCEDURE — 96376 TX/PRO/DX INJ SAME DRUG ADON: CPT

## 2024-07-06 PROCEDURE — 83690 ASSAY OF LIPASE: CPT | Performed by: NURSE PRACTITIONER

## 2024-07-06 PROCEDURE — 96365 THER/PROPH/DIAG IV INF INIT: CPT

## 2024-07-06 PROCEDURE — 84478 ASSAY OF TRIGLYCERIDES: CPT | Performed by: NURSE PRACTITIONER

## 2024-07-06 PROCEDURE — 87040 BLOOD CULTURE FOR BACTERIA: CPT | Mod: 59 | Performed by: INTERNAL MEDICINE

## 2024-07-06 PROCEDURE — 11000001 HC ACUTE MED/SURG PRIVATE ROOM

## 2024-07-06 PROCEDURE — 63600175 PHARM REV CODE 636 W HCPCS: Performed by: INTERNAL MEDICINE

## 2024-07-06 PROCEDURE — 36415 COLL VENOUS BLD VENIPUNCTURE: CPT | Performed by: NURSE PRACTITIONER

## 2024-07-06 PROCEDURE — 83735 ASSAY OF MAGNESIUM: CPT | Performed by: NURSE PRACTITIONER

## 2024-07-06 PROCEDURE — 36415 COLL VENOUS BLD VENIPUNCTURE: CPT | Performed by: INTERNAL MEDICINE

## 2024-07-06 PROCEDURE — 25000003 PHARM REV CODE 250: Performed by: NURSE PRACTITIONER

## 2024-07-06 PROCEDURE — 25000003 PHARM REV CODE 250: Performed by: INTERNAL MEDICINE

## 2024-07-06 PROCEDURE — 80053 COMPREHEN METABOLIC PANEL: CPT | Performed by: NURSE PRACTITIONER

## 2024-07-06 PROCEDURE — 96361 HYDRATE IV INFUSION ADD-ON: CPT

## 2024-07-06 RX ORDER — HEPARIN SODIUM 5000 [USP'U]/ML
5000 INJECTION, SOLUTION INTRAVENOUS; SUBCUTANEOUS EVERY 8 HOURS
Status: DISCONTINUED | OUTPATIENT
Start: 2024-07-06 | End: 2024-07-06 | Stop reason: HOSPADM

## 2024-07-06 RX ORDER — LIDOCAINE HYDROCHLORIDE 20 MG/ML
15 SOLUTION OROPHARYNGEAL ONCE
Status: COMPLETED | OUTPATIENT
Start: 2024-07-06 | End: 2024-07-06

## 2024-07-06 RX ORDER — OXYCODONE HYDROCHLORIDE 5 MG/1
10 TABLET ORAL EVERY 6 HOURS PRN
Qty: 8 TABLET | Refills: 0 | Status: SHIPPED | OUTPATIENT
Start: 2024-07-06

## 2024-07-06 RX ORDER — ONDANSETRON 4 MG/1
8 TABLET, ORALLY DISINTEGRATING ORAL EVERY 8 HOURS PRN
Qty: 20 TABLET | Refills: 0 | Status: SHIPPED | OUTPATIENT
Start: 2024-07-06

## 2024-07-06 RX ORDER — NALOXONE HCL 0.4 MG/ML
0.02 VIAL (ML) INJECTION
Status: DISCONTINUED | OUTPATIENT
Start: 2024-07-06 | End: 2024-07-06 | Stop reason: HOSPADM

## 2024-07-06 RX ORDER — QUETIAPINE FUMARATE 25 MG/1
50 TABLET, FILM COATED ORAL NIGHTLY
Status: DISCONTINUED | OUTPATIENT
Start: 2024-07-06 | End: 2024-07-06

## 2024-07-06 RX ORDER — ALUMINUM HYDROXIDE, MAGNESIUM HYDROXIDE, AND SIMETHICONE 1200; 120; 1200 MG/30ML; MG/30ML; MG/30ML
30 SUSPENSION ORAL ONCE
Status: COMPLETED | OUTPATIENT
Start: 2024-07-06 | End: 2024-07-06

## 2024-07-06 RX ORDER — SODIUM CHLORIDE 0.9 % (FLUSH) 0.9 %
2 SYRINGE (ML) INJECTION EVERY 12 HOURS PRN
Status: DISCONTINUED | OUTPATIENT
Start: 2024-07-06 | End: 2024-07-06 | Stop reason: HOSPADM

## 2024-07-06 RX ORDER — LEVOTHYROXINE SODIUM 100 UG/1
100 TABLET ORAL EVERY MORNING
Status: DISCONTINUED | OUTPATIENT
Start: 2024-07-06 | End: 2024-07-06 | Stop reason: HOSPADM

## 2024-07-06 RX ORDER — OXYCODONE HYDROCHLORIDE 10 MG/1
10 TABLET ORAL EVERY 6 HOURS PRN
Status: DISCONTINUED | OUTPATIENT
Start: 2024-07-06 | End: 2024-07-06 | Stop reason: HOSPADM

## 2024-07-06 RX ORDER — IBUPROFEN 200 MG
24 TABLET ORAL
Status: DISCONTINUED | OUTPATIENT
Start: 2024-07-06 | End: 2024-07-06 | Stop reason: HOSPADM

## 2024-07-06 RX ORDER — FLUTICASONE PROPIONATE 50 MCG
1 SPRAY, SUSPENSION (ML) NASAL DAILY
Status: DISCONTINUED | OUTPATIENT
Start: 2024-07-06 | End: 2024-07-06 | Stop reason: HOSPADM

## 2024-07-06 RX ORDER — IBUPROFEN 200 MG
16 TABLET ORAL
Status: DISCONTINUED | OUTPATIENT
Start: 2024-07-06 | End: 2024-07-06 | Stop reason: HOSPADM

## 2024-07-06 RX ORDER — PANTOPRAZOLE SODIUM 40 MG/1
40 TABLET, DELAYED RELEASE ORAL
Status: DISCONTINUED | OUTPATIENT
Start: 2024-07-06 | End: 2024-07-06 | Stop reason: HOSPADM

## 2024-07-06 RX ORDER — ACETAMINOPHEN 325 MG/1
650 TABLET ORAL EVERY 8 HOURS PRN
Status: DISCONTINUED | OUTPATIENT
Start: 2024-07-06 | End: 2024-07-06 | Stop reason: HOSPADM

## 2024-07-06 RX ORDER — ALUMINUM HYDROXIDE, MAGNESIUM HYDROXIDE, AND SIMETHICONE 1200; 120; 1200 MG/30ML; MG/30ML; MG/30ML
30 SUSPENSION ORAL 4 TIMES DAILY PRN
Status: DISCONTINUED | OUTPATIENT
Start: 2024-07-06 | End: 2024-07-06 | Stop reason: HOSPADM

## 2024-07-06 RX ORDER — GLUCAGON 1 MG
1 KIT INJECTION
Status: DISCONTINUED | OUTPATIENT
Start: 2024-07-06 | End: 2024-07-06 | Stop reason: HOSPADM

## 2024-07-06 RX ORDER — DILTIAZEM HYDROCHLORIDE 30 MG/1
120 TABLET, FILM COATED ORAL DAILY
Status: DISCONTINUED | OUTPATIENT
Start: 2024-07-06 | End: 2024-07-06 | Stop reason: HOSPADM

## 2024-07-06 RX ORDER — ONDANSETRON HYDROCHLORIDE 2 MG/ML
4 INJECTION, SOLUTION INTRAVENOUS EVERY 6 HOURS PRN
Status: DISCONTINUED | OUTPATIENT
Start: 2024-07-06 | End: 2024-07-06 | Stop reason: HOSPADM

## 2024-07-06 RX ORDER — METOPROLOL SUCCINATE 50 MG/1
50 TABLET, EXTENDED RELEASE ORAL 2 TIMES DAILY
Status: DISCONTINUED | OUTPATIENT
Start: 2024-07-06 | End: 2024-07-06 | Stop reason: HOSPADM

## 2024-07-06 RX ORDER — OXYCODONE AND ACETAMINOPHEN 10; 325 MG/1; MG/1
1 TABLET ORAL EVERY 6 HOURS PRN
Status: DISCONTINUED | OUTPATIENT
Start: 2024-07-06 | End: 2024-07-06

## 2024-07-06 RX ORDER — SODIUM CHLORIDE 9 MG/ML
INJECTION, SOLUTION INTRAVENOUS CONTINUOUS
Status: DISCONTINUED | OUTPATIENT
Start: 2024-07-06 | End: 2024-07-06 | Stop reason: HOSPADM

## 2024-07-06 RX ORDER — TALC
6 POWDER (GRAM) TOPICAL NIGHTLY PRN
Status: DISCONTINUED | OUTPATIENT
Start: 2024-07-06 | End: 2024-07-06 | Stop reason: HOSPADM

## 2024-07-06 RX ADMIN — SODIUM CHLORIDE 1000 ML: 9 INJECTION, SOLUTION INTRAVENOUS at 01:07

## 2024-07-06 RX ADMIN — SODIUM POLYSTYRENE SULFONATE 15 G: 15 SUSPENSION ORAL; RECTAL at 05:07

## 2024-07-06 RX ADMIN — ONDANSETRON 4 MG: 2 INJECTION INTRAMUSCULAR; INTRAVENOUS at 12:07

## 2024-07-06 RX ADMIN — LEVOTHYROXINE SODIUM 100 MCG: 100 TABLET ORAL at 08:07

## 2024-07-06 RX ADMIN — ONDANSETRON 4 MG: 2 INJECTION INTRAMUSCULAR; INTRAVENOUS at 05:07

## 2024-07-06 RX ADMIN — OXYCODONE HYDROCHLORIDE 10 MG: 10 TABLET ORAL at 12:07

## 2024-07-06 RX ADMIN — SODIUM CHLORIDE: 9 INJECTION, SOLUTION INTRAVENOUS at 06:07

## 2024-07-06 RX ADMIN — OXYCODONE HYDROCHLORIDE AND ACETAMINOPHEN 1 TABLET: 10; 325 TABLET ORAL at 05:07

## 2024-07-06 RX ADMIN — Medication 6 MG: at 05:07

## 2024-07-06 RX ADMIN — HEPARIN SODIUM 5000 UNITS: 5000 INJECTION INTRAVENOUS; SUBCUTANEOUS at 06:07

## 2024-07-06 RX ADMIN — PIPERACILLIN SODIUM AND TAZOBACTAM SODIUM 3.38 G: 3; .375 INJECTION, POWDER, LYOPHILIZED, FOR SOLUTION INTRAVENOUS at 05:07

## 2024-07-06 RX ADMIN — ALUMINUM HYDROXIDE, MAGNESIUM HYDROXIDE, AND SIMETHICONE 30 ML: 200; 200; 20 SUSPENSION ORAL at 01:07

## 2024-07-06 RX ADMIN — LIDOCAINE HYDROCHLORIDE 15 ML: 20 SOLUTION ORAL at 01:07

## 2024-07-06 RX ADMIN — PANTOPRAZOLE SODIUM 40 MG: 40 TABLET, DELAYED RELEASE ORAL at 06:07

## 2024-07-06 NOTE — FIRST PROVIDER EVALUATION
" Emergency Department TeleTriage Encounter Note      CHIEF COMPLAINT    Chief Complaint   Patient presents with    Vomiting     Pt says she has been vomiting and having cramping all over.  Pt says cramping is worsening and decided to come to ED because of a hx of SVT       VITAL SIGNS   Initial Vitals [07/05/24 2000]   BP Pulse Resp Temp SpO2   (!) 174/117 81 18 97.3 °F (36.3 °C) 99 %      MAP       --            ALLERGIES    Review of patient's allergies indicates:   Allergen Reactions    Nuvigil [armodafinil] Hives    Phenergan plain Anxiety       PROVIDER TRIAGE NOTE  This is a teletriage evaluation of a 49 y.o. female presenting to the ED complaining of vomiting after getting hot at work today. States, "I've been sweating all day and now I'm cramping all over."  Reports chest tightness. No SOB.     ALert, no distress. Requesting TSH testing.     Initial orders will be placed and care will be transferred to an alternate provider when patient is roomed for a full evaluation. Any additional orders and the final disposition will be determined by that provider.         ORDERS  Labs Reviewed   CBC W/ AUTO DIFFERENTIAL   COMPREHENSIVE METABOLIC PANEL   TROPONIN I HIGH SENSITIVITY   CK   TSH       ED Orders (720h ago, onward)      Start Ordered     Status Ordering Provider    07/05/24 2200 07/05/24 2007  Vital Signs  Every 2 hours         Ordered KEYSHAWN STEWART    07/05/24 2015 07/05/24 2007  sodium chloride 0.9% bolus 1,000 mL 1,000 mL  ED 1 Time         Ordered KEYSHAWN STEWART    07/05/24 2015 07/05/24 2008  ondansetron 4 mg/5 mL solution 2 mg  ED 1 Time         Ordered KEYSHAWN STEWART    07/05/24 2009 07/05/24 2008  TSH  STAT         Ordered KEYSHAWN STEWART    07/05/24 2008 07/05/24 2007  Comprehensive metabolic panel  STAT         Ordered KEYSHAWN STEWART    07/05/24 2008 07/05/24 2007  Insert Saline lock IV  Once         Ordered KEYSHAWN STEWART    07/05/24 " 2008 07/05/24 2007  EKG 12-lead  Once         Ordered BLADIMIRPHOEBEKEYSHAWN N.    07/05/24 2008 07/05/24 2007  Cardiac Monitoring - Adult  Continuous        Comments: Notify Physician If:    Ordered BLADIMIRPHOEBEMARGUERITEKEYSHAWN N.    07/05/24 2008 07/05/24 2007  Pulse Oximetry Continuous  Continuous         Ordered BLADIMIRPHOEBE KEYSHAWN N.    07/05/24 2008 07/05/24 2007  Troponin I High Sensitivity  STAT         Ordered SHAMEKAAZAELNAOHFERCHO KEYSHAWN N.    07/05/24 2008 07/05/24 2007  CPK  STAT         Ordered BLADIMIRPHOEBE KEYSHAWN N.    07/05/24 2007 07/05/24 2007  CBC auto differential  STAT         Ordered SHANNONNEELAM KEYSHAWN N.              Virtual Visit Note: The provider triage portion of this emergency department evaluation and documentation was performed via [employer], a HIPAA-compliant telemedicine application, in concert with a tele-presenter in the room. A face to face patient evaluation with one of my colleagues will occur once the patient is placed in an emergency department room.      DISCLAIMER: This note was prepared with [employer] voice recognition transcription software. Garbled syntax, mangled pronouns, and other bizarre constructions may be attributed to that software system.

## 2024-07-06 NOTE — HOSPITAL COURSE
Patient was admitted with MAXINE secondary to severe dehydration.  She was monitored closely during her stay.  Her labs and vital signs were trended closely.  Her MAXINE resolved with IV fluid hydration.  Her nausea was well controlled with antiemetics p.r.n..  She was given p.r.n. narcotics for abdominal discomfort.  CT abdomen and pelvis was obtained and negative for acute intra-abdominal process.  Slowly advanced, for which she tolerated.  Discharge instructions as well as return precautions were discussed with patient with good understanding.  Patient seen and evaluated on day of discharge and deemed appropriate.

## 2024-07-06 NOTE — ED PROVIDER NOTES
Encounter Date: 7/5/2024       History     Chief Complaint   Patient presents with    Vomiting     Pt says she has been vomiting and having cramping all over.  Pt says cramping is worsening and decided to come to ED because of a hx of SVT     HPI  Vitreal female with history of hypertension, SVT, hypothyroidism, GERD presents to the ER complaining of nausea and vomiting starting this morning with persistent since despite taking a mg of oral Zofran.  No fevers or chills however when she was at work she felt flushed which initiated the vomiting.  Unable to tolerate p.o. intake throughout the day today.  No other distinct fevers or chills. Chest pain that is retrosternal and lasts 1-2 minutes at a time then resolves; no home treatments. No diarrhea but notes distinct increase in bowel movements yesterday but states they are all formed. Complains of cramping epigastric pain.  Had some rhinorrhea yesterday and today but no headache, shortness of breath, cough, chest pain, diarrhea.    Review of patient's allergies indicates:   Allergen Reactions    Nuvigil [armodafinil] Hives    Phenergan plain Anxiety     Past Medical History:   Diagnosis Date    GE reflux     Hypertension     SVT (supraventricular tachycardia)     Thyroid disease      Past Surgical History:   Procedure Laterality Date    AUGMENTATION OF BREAST      BILATERAL TUBAL LIGATION      OPEN REDUCTION AND INTERNAL FIXATION (ORIF) OF INJURY OF FINGER Left 3/25/2024    Procedure: ORIF, FINGER ring finger;  Surgeon: Dejuan Munguia MD;  Location: [facility];  Service: Orthopedics;  Laterality: Left;  Proximal Phalanx     Family History   Problem Relation Name Age of Onset    Cancer Mother          kidney     Social History     Tobacco Use    Smoking status: Every Day     Types: Vaping with nicotine    Smokeless tobacco: Never   Substance Use Topics    Alcohol use: Yes     Comment: occasional    Drug use: Never     Review of Systems   Constitutional:  Positive for  diaphoresis. Negative for chills and fever.   Eyes:  Negative for visual disturbance.   Respiratory:  Negative for cough and shortness of breath.    Cardiovascular:  Negative for palpitations.   Gastrointestinal:  Positive for abdominal pain. Negative for abdominal distention.   Genitourinary:  Negative for dysuria and flank pain.   Skin:  Negative for rash.   Allergic/Immunologic: Negative for environmental allergies and food allergies.       Physical Exam     Initial Vitals [07/05/24 2000]   BP Pulse Resp Temp SpO2   (!) 174/117 81 18 97.3 °F (36.3 °C) 99 %      MAP       --         Physical Exam    Nursing note and vitals reviewed.  Constitutional: She appears well-developed and well-nourished. She is not diaphoretic. No distress.   HENT:   Head: Normocephalic and atraumatic.   Right Ear: External ear normal.   Mouth/Throat: Oropharynx is clear and moist. No oropharyngeal exudate.   Neck: Neck supple. No tracheal deviation present.   Normal range of motion.  Cardiovascular:  Normal rate, regular rhythm, normal heart sounds and intact distal pulses.     Exam reveals no gallop and no friction rub.       No murmur heard.  Pulmonary/Chest: Breath sounds normal. No respiratory distress.   Abdominal: Abdomen is soft. She exhibits no distension. There is abdominal tenderness (epigastric; negative guillen's). There is no guarding.   Musculoskeletal:         General: No tenderness or edema. Normal range of motion.      Cervical back: Normal range of motion and neck supple.     Neurological: She is alert and oriented to person, place, and time. GCS score is 15. GCS eye subscore is 4. GCS verbal subscore is 5. GCS motor subscore is 6.   Skin: Skin is warm and dry.   Psychiatric: She has a normal mood and affect. Thought content normal.         ED Course   Procedures  Labs Reviewed   CBC W/ AUTO DIFFERENTIAL - Abnormal; Notable for the following components:       Result Value    WBC 19.15 (*)     Hemoglobin 17.1 (*)      Hematocrit 49.7 (*)     MCH 31.9 (*)     Gran # (ANC) 15.3 (*)     Immature Grans (Abs) 0.07 (*)     Mono # 1.7 (*)     Gran % 79.9 (*)     Lymph % 10.8 (*)     All other components within normal limits   COMPREHENSIVE METABOLIC PANEL - Abnormal; Notable for the following components:    Sodium 132 (*)     Potassium 5.6 (*)     Chloride 93 (*)     Glucose 112 (*)     BUN 25 (*)     Creatinine 2.0 (*)     Calcium 10.6 (*)     Total Protein 8.9 (*)     Albumin 5.4 (*)     AST 59 (*)     ALT 80 (*)     eGFR 29.9 (*)     All other components within normal limits   TSH - Abnormal; Notable for the following components:    TSH 20.309 (*)     All other components within normal limits   LIPASE - Abnormal; Notable for the following components:    Lipase 153 (*)     All other components within normal limits   TROPONIN I HIGH SENSITIVITY   CK   LIPASE   T4, FREE   TROPONIN I HIGH SENSITIVITY   TROPONIN I HIGH SENSITIVITY   T4, FREE          Imaging Results              CT Abdomen Pelvis  Without Contrast (Final result)  Result time 07/05/24 23:20:50      Final result by Kristin Villalobos MD (07/05/24 23:20:50)                   Impression:      No acute findings.      Electronically signed by: Kristin Villalobos  Date:    07/05/2024  Time:    23:20               Narrative:    EXAMINATION:  CT ABDOMEN PELVIS WITHOUT CONTRAST    CLINICAL HISTORY:  Epigastric pain;    TECHNIQUE:  Low dose axial images, sagittal and coronal reformations were obtained from the lung bases to the pubic symphysis.  Oral contrast was not administered.    COMPARISON:  None    FINDINGS:  Heart: Normal in size. No pericardial effusion.    Lung Bases: Well aerated, without consolidation or pleural fluid.    Liver: Normal in size and attenuation, with no focal hepatic lesions.    Gallbladder: No calcified gallstones.    Bile Ducts: No evidence of dilated ducts.    Pancreas: No mass or peripancreatic fat stranding.    Spleen: Unremarkable.    Adrenals:  Unremarkable.    Kidneys/ Ureters: Nonobstructive right nephrolithiasis.    Bladder: No evidence of wall thickening.    Reproductive organs: Unremarkable.    GI Tract/Mesentery: No evidence of bowel obstruction or inflammation.    Peritoneal Space: No ascites. No free air.    Retroperitoneum: No significant adenopathy.    Abdominal wall: Unremarkable.    Vasculature: No significant atherosclerosis or aneurysm.    Bones: Grade 2 anterolisthesis at L5-S1 with chronic bilateral L5 pars fractures and severe discogenic disease.                                       X-Ray Chest PA And Lateral (Final result)  Result time 07/05/24 22:55:54      Final result by Kristin Villalobos MD (07/05/24 22:55:54)                   Impression:      No acute findings.      Electronically signed by: Kristin Villalobos  Date:    07/05/2024  Time:    22:55               Narrative:    EXAMINATION:  XR CHEST PA AND LATERAL    CLINICAL HISTORY:  Chest pain, unspecified    TECHNIQUE:  PA and lateral views of the chest were performed.    COMPARISON:  11/09/2018    FINDINGS:  Lungs are clear. No focal consolidation. No pleural effusion. No pneumothorax. Normal heart size.                                       Medications   sodium chloride 0.9% bolus 1,000 mL 1,000 mL (1,000 mLs Intravenous New Bag 7/6/24 0147)   sodium chloride 0.9% bolus 1,000 mL 1,000 mL (0 mLs Intravenous Stopped 7/5/24 2229)   ondansetron 4 mg/5 mL solution 2 mg (2 mg Oral Given 7/5/24 2130)   ondansetron injection 6 mg (6 mg Intravenous Given 7/5/24 2242)   famotidine (PF) injection 20 mg (20 mg Intravenous Given 7/5/24 2239)   aluminum-magnesium hydroxide-simethicone 200-200-20 mg/5 mL suspension 30 mL (30 mLs Oral Given 7/6/24 0147)     And   LIDOcaine viscous HCl 2% oral solution 15 mL (15 mLs Oral Given 7/6/24 0147)     Medical Decision Making  PGY 5 MDM    49-year-old female with history of SVT, hypertension presents to the ED complaining of vomiting as well as  intermittent chest pain x1 day.  Afebrile, vital signs stable, exam with epigastric/LUQ discomfort without rebound or guarding; negative Barillas's sign.  Differential includes ACS, dysrhythmia, cholelithiasis/cholecystitis, pancreatitis, gastritis, gastroenteritis among others. Workup undertaken and returns with leukocytosis however all chemistries appear hemoconcentrated given hemoglobin of 17.1 and significantly elevated albumin/total protein on CMP, mild transaminitis, MAXINE and TSH elevated higher than prior with normal FT4.  EKG nonischemic appearing, troponin negative.  Given additional dose of Zofran while in the ED without further vomiting.  Given total of 2 L IV fluids.  Given MAXINE and electrolyte abnormalities patient admitted to  service for ongoing care.     Chava Nichols DO  [facility]        Amount and/or Complexity of Data Reviewed  Labs: ordered. Decision-making details documented in ED Course.  Radiology: ordered and independent interpretation performed.     Details: CXR without acute process  ECG/medicine tests: ordered and independent interpretation performed. Decision-making details documented in ED Course.    Risk  OTC drugs.  Prescription drug management.  Decision regarding hospitalization.               ED Course as of 07/06/24 0225 Fri Jul 05, 2024   2158 WBC(!): 19.15 [MJ]   2218 EKG 12-lead  NSR, incomplete RBBB, no ST-T changes. No T-wave changes.  [MJ]   2219 ALT(!): 80 [MJ]   2219 AST(!): 59 [MJ]   2219 Creatinine(!): 2.0  From 1.2 one month ago.  [MJ]   2219 Hemoglobin(!): 17.1  Appears hemoconcentrated [MJ]   2252 Free T4: 1.01 [MJ]   2252 TSH(!): 20.309  Elevated higher than prior outpatient draw at 13   [MJ]      ED Course User Index  [MJ] Chava Nichols DO                           Clinical Impression:  Final diagnoses:  [R07.9] Chest pain  [R11.2] Nausea and vomiting, unspecified vomiting type  [N17.9] MAXINE (acute kidney injury)  (Primary)          ED Disposition Condition    Observation Stable                Chava Nichols, DO  Resident  07/06/24 2097

## 2024-07-06 NOTE — DISCHARGE SUMMARY
[facility] - Emergency Dept  Hospital Medicine  Discharge Summary      Patient Name: Efren Dove  MRN: [id number]  GEN: [id number]  Patient Class: IP- Inpatient  Admission Date: 7/5/2024  Hospital Length of Stay: 0 days  Discharge Date and Time:  07/06/2024 2:05 PM  Attending Physician: Argenis Jaffe MD   Discharging Provider: Dawna Anthony NP  Primary Care Provider: José Manuel Salinas MD    Primary Care Team: Networked reference to record PCT     HPI:   This is a case of 49-year-old female with medical history of hypertension, ADHD, SVT on diltiazem.  Patient presented to the emergency department's with episodes of nausea and vomiting.  She said the episode started this morning.  She denied any other associated symptoms like fever, chest pain, chest tightness, or shortness of the breath.  She reported some abdominal pain in the center of her abdomen/epigastric radiating to the left side of her abdomen.  Nothing made it better nothing made it worse.  She received a GI cocktail in the emergency department's and she was found to have MAXINE.  Patient will be admitted to the hospital.    * No surgery found *      Hospital Course:   Patient was admitted with MAXINE secondary to severe dehydration.  She was monitored closely during her stay.  Her labs and vital signs were trended closely.  Her MAXINE resolved with IV fluid hydration.  Her nausea was well controlled with antiemetics p.r.n..  She was given p.r.n. narcotics for abdominal discomfort.  CT abdomen and pelvis was obtained and negative for acute intra-abdominal process.  Slowly advanced, for which she tolerated.  Discharge instructions as well as return precautions were discussed with patient with good understanding.  Patient seen and evaluated on day of discharge and deemed appropriate.     Goals of Care Treatment Preferences:  Code Status: Full Code      Consults:     GI  * Acute pancreatitis  Leukocytosis is noted.  Lipase is 150s.  Start  patient on Zosyn.  Empirically  Obtain blood culture  Clear liquid diet for now  CT scan of abdomen and pelvis did not reveal any acute finding  Dehydration is highly suspicious for now  Acute gastritis, acute hepatitis are in the differential  Continue to monitor  IV fluid        Final Active Diagnoses:    Diagnosis Date Noted POA    PRINCIPAL PROBLEM:  Acute pancreatitis [K85.90] 07/06/2024 Yes    Acute hepatitis [B17.9] 07/06/2024 Yes    Transaminitis [R74.01] 07/06/2024 Yes    MAXINE (acute kidney injury) [N17.9] 07/06/2024 Yes    Hyperkalemia [E87.5] 07/06/2024 Yes    Essential hypertension [I10] 07/06/2024 Yes    Hypothyroidism [E03.9] 07/06/2024 Yes      Problems Resolved During this Admission:       Discharged Condition: good    Disposition: Home or Self Care    Follow Up:   Follow-up Information       José Manuel Salinas MD Follow up in 1 week(s).    Specialty: Family Medicine  Contact information:  [address] [place] [id number]  [phone]                           Patient Instructions:      Diet Adult Regular     Notify your health care provider if you experience any of the following:  temperature >100.4     Notify your health care provider if you experience any of the following:  persistent nausea and vomiting or diarrhea     Notify your health care provider if you experience any of the following:  severe uncontrolled pain     Notify your health care provider if you experience any of the following:  difficulty breathing or increased cough     Notify your health care provider if you experience any of the following:  severe persistent headache     Notify your health care provider if you experience any of the following:  worsening rash     Notify your health care provider if you experience any of the following:  increased confusion or weakness     Activity as tolerated       Significant Diagnostic Studies: Labs: CMP   Recent Labs   Lab 07/05/24  2130 07/06/24  0818   * 136   K 5.6* 4.2   CL  93* 101   CO2 27 26   * 95   BUN 25* 18   CREATININE 2.0* 1.2   CALCIUM 10.6* 9.1   PROT 8.9* 6.9   ALBUMIN 5.4* 4.3   BILITOT 0.7 1.1*   ALKPHOS 87 69   AST 59* 35   ALT 80* 55*   ANIONGAP 12 9    and CBC   Recent Labs   Lab 07/05/24  2130 07/06/24  0823   WBC 19.15* 8.21   HGB 17.1* 14.4   HCT 49.7* 42.6    227     Radiology: CT scan: CT ABDOMEN PELVIS WITHOUT CONTRAST:   Results for orders placed or performed during the hospital encounter of 07/05/24   CT Abdomen Pelvis  Without Contrast    Narrative    EXAMINATION:  CT ABDOMEN PELVIS WITHOUT CONTRAST    CLINICAL HISTORY:  Epigastric pain;    TECHNIQUE:  Low dose axial images, sagittal and coronal reformations were obtained from the lung bases to the pubic symphysis.  Oral contrast was not administered.    COMPARISON:  None    FINDINGS:  Heart: Normal in size. No pericardial effusion.    Lung Bases: Well aerated, without consolidation or pleural fluid.    Liver: Normal in size and attenuation, with no focal hepatic lesions.    Gallbladder: No calcified gallstones.    Bile Ducts: No evidence of dilated ducts.    Pancreas: No mass or peripancreatic fat stranding.    Spleen: Unremarkable.    Adrenals: Unremarkable.    Kidneys/ Ureters: Nonobstructive right nephrolithiasis.    Bladder: No evidence of wall thickening.    Reproductive organs: Unremarkable.    GI Tract/Mesentery: No evidence of bowel obstruction or inflammation.    Peritoneal Space: No ascites. No free air.    Retroperitoneum: No significant adenopathy.    Abdominal wall: Unremarkable.    Vasculature: No significant atherosclerosis or aneurysm.    Bones: Grade 2 anterolisthesis at L5-S1 with chronic bilateral L5 pars fractures and severe discogenic disease.      Impression    No acute findings.      Electronically signed by: Kristin Villalobos  Date:    07/05/2024  Time:    23:20       Pending Diagnostic Studies:       Procedure Component Value Units Date/Time    Hepatitis panel, acute  [[id number]] Collected: 07/06/24 0837    Order Status: Sent Lab Status: In process Updated: 07/06/24 0838    Specimen: Blood            Medications:  Reconciled Home Medications:      Medication List        START taking these medications      ondansetron 4 MG Tbdl  Commonly known as: ZOFRAN-ODT  Take 2 tablets (8 mg total) by mouth every 8 (eight) hours as needed.     oxyCODONE 5 MG immediate release tablet  Commonly known as: ROXICODONE  Take 2 tablets (10 mg total) by mouth every 6 (six) hours as needed for Pain.            CONTINUE taking these medications      dextroamphetamine-amphetamine 30 mg Tab  Take 30 mg by mouth 2 (two) times a day.     diltiaZEM 120 MG tablet  Commonly known as: CARDIZEM  Take 120 mg by mouth once daily.     fluticasone propionate 50 mcg/actuation nasal spray  Commonly known as: FLONASE  1 spray by Each Nostril route once daily.     guanFACINE 2 MG tablet  Commonly known as: TENEX  Take 2 mg by mouth every evening.     levothyroxine 100 MCG tablet  Commonly known as: SYNTHROID  Take 100 mcg by mouth every morning.     metoprolol succinate 100 MG 24 hr tablet  Commonly known as: TOPROL-XL  Take 50 mg by mouth 2 (two) times daily.     pantoprazole 40 MG tablet  Commonly known as: PROTONIX  Take 40 mg by mouth once daily.     spironolactone 100 MG tablet  Commonly known as: ALDACTONE  Take 100 mg by mouth 2 (two) times daily.            STOP taking these medications      oxyCODONE-acetaminophen  mg per tablet  Commonly known as: PERCOCET     QUEtiapine 50 MG tablet  Commonly known as: SEROQUEL              Indwelling Lines/Drains at time of discharge:   Lines/Drains/Airways       None                   Time spent on the discharge of patient: 32 minutes         Dawna Anthony NP  Department of Hospital Medicine  [facility] - Emergency Dept

## 2024-07-06 NOTE — H&P
[facility] - Emergency Dept  Hospital Medicine  History & Physical    Patient Name: Efren Dove  MRN: [id number]  Patient Class: IP- Inpatient  Admission Date: 7/5/2024  Attending Physician: Miguel Jackson DO  Primary Care Provider: José Manuel Salinas MD         Patient information was obtained from patient and ER records.     Subjective:     Principal Problem:Acute pancreatitis    Chief Complaint:   Chief Complaint   Patient presents with    Vomiting     Pt says she has been vomiting and having cramping all over.  Pt says cramping is worsening and decided to come to ED because of a hx of SVT        HPI: This is a case of 49-year-old female with medical history of hypertension, ADHD, SVT on diltiazem.  Patient presented to the emergency department's with episodes of nausea and vomiting.  She said the episode started this morning.  She denied any other associated symptoms like fever, chest pain, chest tightness, or shortness of the breath.  She reported some abdominal pain in the center of her abdomen/epigastric radiating to the left side of her abdomen.  Nothing made it better nothing made it worse.  She received a GI cocktail in the emergency department's and she was found to have MAXINE.  Patient will be admitted to the hospital.    Past Medical History:   Diagnosis Date    GE reflux     Hypertension     SVT (supraventricular tachycardia)     Thyroid disease        Past Surgical History:   Procedure Laterality Date    AUGMENTATION OF BREAST      BILATERAL TUBAL LIGATION      OPEN REDUCTION AND INTERNAL FIXATION (ORIF) OF INJURY OF FINGER Left 3/25/2024    Procedure: ORIF, FINGER ring finger;  Surgeon: Dejuan Munguia MD;  Location: [facility];  Service: Orthopedics;  Laterality: Left;  Proximal Phalanx       Review of patient's allergies indicates:   Allergen Reactions    Nuvigil [armodafinil] Hives    Phenergan plain Anxiety       Current Facility-Administered Medications on File Prior to Encounter    Medication    albuterol nebulizer solution 2.5 mg    albuterol-ipratropium 2.5 mg-0.5 mg/3 mL nebulizer solution 3 mL    diphenhydrAMINE injection 25 mg    HYDROmorphone injection 0.5 mg    lactated ringers infusion    LIDOcaine (PF) 10 mg/ml (1%) injection 10 mg    ondansetron injection 4 mg    sodium chloride 0.9% flush 3 mL     Current Outpatient Medications on File Prior to Encounter   Medication Sig    dextroamphetamine-amphetamine 30 mg Tab Take 30 mg by mouth 2 (two) times a day.    diltiaZEM (CARDIZEM) 120 MG tablet Take 120 mg by mouth once daily.    fluticasone propionate (FLONASE) 50 mcg/actuation nasal spray 1 spray by Each Nostril route once daily.    guanFACINE (TENEX) 2 MG tablet Take 2 mg by mouth every evening.    levothyroxine (SYNTHROID) 100 MCG tablet Take 100 mcg by mouth every morning.    metoprolol succinate (TOPROL-XL) 100 MG 24 hr tablet Take 50 mg by mouth 2 (two) times daily.    oxyCODONE-acetaminophen (PERCOCET)  mg per tablet Take 1 tablet by mouth every 6 (six) hours as needed for Pain.    pantoprazole (PROTONIX) 40 MG tablet Take 40 mg by mouth once daily.    QUEtiapine (SEROQUEL) 50 MG tablet Take 50 mg by mouth every evening.    spironolactone (ALDACTONE) 100 MG tablet Take 100 mg by mouth 2 (two) times daily.     Family History       Problem Relation (Age of Onset)    Cancer Mother          Tobacco Use    Smoking status: Every Day     Types: Vaping with nicotine    Smokeless tobacco: Never   Substance and Sexual Activity    Alcohol use: Yes     Comment: occasional    Drug use: Never    Sexual activity: Yes     Partners: Female     Review of Systems   Constitutional:  Negative for activity change and appetite change.   HENT:  Negative for congestion, dental problem and drooling.    Eyes:  Negative for discharge and itching.   Respiratory:  Negative for apnea, cough, choking and chest tightness.    Cardiovascular:  Negative for chest pain, palpitations and leg swelling.    Gastrointestinal:  Positive for abdominal pain, nausea and vomiting.   Endocrine: Negative for cold intolerance and heat intolerance.   Genitourinary:  Negative for difficulty urinating, dyspareunia, dysuria, enuresis and flank pain.   Musculoskeletal:  Negative for arthralgias and back pain.   Skin:  Negative for color change and pallor.   Allergic/Immunologic: Negative for environmental allergies and food allergies.   Neurological:  Negative for dizziness, facial asymmetry, light-headedness and headaches.   Hematological:  Negative for adenopathy. Does not bruise/bleed easily.   Psychiatric/Behavioral:  Negative for agitation, behavioral problems and confusion.      Objective:     Vital Signs (Most Recent):  Temp: 97.3 °F (36.3 °C) (07/05/24 2000)  Pulse: 77 (07/06/24 0230)  Resp: (!) 22 (07/06/24 0230)  BP: (!) 166/92 (07/06/24 0230)  SpO2: 99 % (07/06/24 0230) Vital Signs (24h Range):  Temp:  [97.3 °F (36.3 °C)] 97.3 °F (36.3 °C)  Pulse:  [] 77  Resp:  [18-41] 22  SpO2:  [96 %-100 %] 99 %  BP: (138-180)/() 166/92     Weight: 70.3 kg (155 lb)  Body mass index is 24.28 kg/m².     Physical Exam  Constitutional:       General: She is not in acute distress.     Appearance: Normal appearance. She is not ill-appearing.   HENT:      Head: Normocephalic and atraumatic.      Right Ear: There is no impacted cerumen.      Left Ear: There is no impacted cerumen.      Nose: No congestion or rhinorrhea.      Mouth/Throat:      Pharynx: No oropharyngeal exudate or posterior oropharyngeal erythema.   Cardiovascular:      Rate and Rhythm: Normal rate and regular rhythm.      Heart sounds: No murmur heard.     No friction rub.   Pulmonary:      Effort: Pulmonary effort is normal. No respiratory distress.      Breath sounds: Normal breath sounds. No stridor.   Abdominal:      General: There is no distension.      Palpations: Abdomen is soft. There is no mass.      Tenderness: There is abdominal tenderness. There is no  rebound.      Hernia: No hernia is present.   Musculoskeletal:         General: No swelling or tenderness.   Skin:     Coloration: Skin is not jaundiced or pale.   Neurological:      General: No focal deficit present.      Mental Status: She is alert and oriented to person, place, and time. Mental status is at baseline.      Cranial Nerves: No cranial nerve deficit.      Sensory: No sensory deficit.                Significant Labs: All pertinent labs within the past 24 hours have been reviewed.  Bilirubin:   Recent Labs   Lab 07/05/24 2130   BILITOT 0.7       BMP:   Recent Labs   Lab 07/05/24 2130   *   *   K 5.6*   CL 93*   CO2 27   BUN 25*   CREATININE 2.0*   CALCIUM 10.6*     CBC:   Recent Labs   Lab 07/05/24 2130   WBC 19.15*   HGB 17.1*   HCT 49.7*        CMP:   Recent Labs   Lab 07/05/24 2130   *   K 5.6*   CL 93*   CO2 27   *   BUN 25*   CREATININE 2.0*   CALCIUM 10.6*   PROT 8.9*   ALBUMIN 5.4*   BILITOT 0.7   ALKPHOS 87   AST 59*   ALT 80*   ANIONGAP 12       Lipase:   Recent Labs   Lab 07/05/24 2130   LIPASE 153*     Troponin:   Recent Labs   Lab 07/05/24 2130 07/05/24  2334   TROPONINIHS 5.7 5.3     TSH:   Recent Labs   Lab 07/05/24 2130   TSH 20.309*       Significant Imaging: I have reviewed all pertinent imaging results/findings within the past 24 hours.  Assessment/Plan:     * Acute pancreatitis  Leukocytosis is noted.  Lipase is 150s.  Start patient on Zosyn.  Empirically  Obtain blood culture  Clear liquid diet for now  CT scan of abdomen and pelvis did not reveal any acute finding  Dehydration is highly suspicious for now  Acute gastritis, acute hepatitis are in the differential  Continue to monitor  IV fluid      Hypothyroidism    Elevated TSH, patient is also dehydrated.  May consider repeat TSH during this hospitalization when the patient gets more hydration.  Continue on home dose of levothyroxine.    Essential hypertension  Patient also reported history  of SVT she is on diltiazem and metoprolol  Hold losartan because of hyperkalemia      Hyperkalemia  This patient has hyperkalemia which is controlled. We will monitor for arrhythmias with EKG or continuous telemetry. We will treat the hyperkalemia with Potassium Binders. The likely etiology of the hyperkalemia is MAXINE.  The patients latest potassium has been reviewed and the results are listed below  Recent Labs   Lab 07/05/24  2130   K 5.6*   We will give 1 dose of Kayexalate  Hold home dose spironolactone        MAXINE (acute kidney injury)  Patient with acute kidney injury/acute renal failure likely due to pre-renal azotemia due to dehydration  Baseline creatinine unknown - Labs reviewed- Renal function/electrolytes with Estimated Creatinine Clearance: 32.7 mL/min (A) (based on SCr of 2 mg/dL (H)). according to latest data. Monitor urine output and serial BMP and adjust therapy as needed. Avoid nephrotoxins and renally dose meds for GFR listed above.  Continue to monitor  Patient received 1 L of IV fluids in the ER  Continue hydration at 100 mL/hour  If creatinine does not improve, may consider consulting Nephrology Service    Transaminitis  Continue to monitor  CT scan of the abdomen and pelvis revealed no findings      Acute hepatitis  Acute hepatitis panel is ordered  Clear liquid diet  Zosyn empirically  CMP in the morning      EKG was normal  VTE Risk Mitigation (From admission, onward)           Ordered     heparin (porcine) injection 5,000 Units  Every 8 hours         07/06/24 0317     IP VTE LOW RISK PATIENT  Once         07/06/24 0317     Place sequential compression device  Until discontinued         07/06/24 0317                                    Miguel Jackson DO  Department of Hospital Medicine  [facility] - Emergency Dept

## 2024-07-06 NOTE — ASSESSMENT & PLAN NOTE
This patient has hyperkalemia which is controlled. We will monitor for arrhythmias with EKG or continuous telemetry. We will treat the hyperkalemia with Potassium Binders. The likely etiology of the hyperkalemia is MAXINE.  The patients latest potassium has been reviewed and the results are listed below  Recent Labs   Lab 07/05/24  2130   K 5.6*   We will give 1 dose of Kayexalate  Hold home dose spironolactone

## 2024-07-06 NOTE — ED NOTES
Patient states she is cramping and has vomited multiple times today.  Complains of active cramps in legs and chest pain.

## 2024-07-06 NOTE — ASSESSMENT & PLAN NOTE
Leukocytosis is noted.  Lipase is 150s.  Start patient on Zosyn.  Empirically  Obtain blood culture  Clear liquid diet for now  CT scan of abdomen and pelvis did not reveal any acute finding  Dehydration is highly suspicious for now  Acute gastritis, acute hepatitis are in the differential  Continue to monitor  IV fluid

## 2024-07-06 NOTE — SUBJECTIVE & OBJECTIVE
Past Medical History:   Diagnosis Date    GE reflux     Hypertension     SVT (supraventricular tachycardia)     Thyroid disease        Past Surgical History:   Procedure Laterality Date    AUGMENTATION OF BREAST      BILATERAL TUBAL LIGATION      OPEN REDUCTION AND INTERNAL FIXATION (ORIF) OF INJURY OF FINGER Left 3/25/2024    Procedure: ORIF, FINGER ring finger;  Surgeon: Dejuan Munguia MD;  Location: [facility];  Service: Orthopedics;  Laterality: Left;  Proximal Phalanx       Review of patient's allergies indicates:   Allergen Reactions    Nuvigil [armodafinil] Hives    Phenergan plain Anxiety       Current Facility-Administered Medications on File Prior to Encounter   Medication    albuterol nebulizer solution 2.5 mg    albuterol-ipratropium 2.5 mg-0.5 mg/3 mL nebulizer solution 3 mL    diphenhydrAMINE injection 25 mg    HYDROmorphone injection 0.5 mg    lactated ringers infusion    LIDOcaine (PF) 10 mg/ml (1%) injection 10 mg    ondansetron injection 4 mg    sodium chloride 0.9% flush 3 mL     Current Outpatient Medications on File Prior to Encounter   Medication Sig    dextroamphetamine-amphetamine 30 mg Tab Take 30 mg by mouth 2 (two) times a day.    diltiaZEM (CARDIZEM) 120 MG tablet Take 120 mg by mouth once daily.    fluticasone propionate (FLONASE) 50 mcg/actuation nasal spray 1 spray by Each Nostril route once daily.    guanFACINE (TENEX) 2 MG tablet Take 2 mg by mouth every evening.    levothyroxine (SYNTHROID) 100 MCG tablet Take 100 mcg by mouth every morning.    metoprolol succinate (TOPROL-XL) 100 MG 24 hr tablet Take 50 mg by mouth 2 (two) times daily.    oxyCODONE-acetaminophen (PERCOCET)  mg per tablet Take 1 tablet by mouth every 6 (six) hours as needed for Pain.    pantoprazole (PROTONIX) 40 MG tablet Take 40 mg by mouth once daily.    QUEtiapine (SEROQUEL) 50 MG tablet Take 50 mg by mouth every evening.    spironolactone (ALDACTONE) 100 MG tablet Take 100 mg by mouth 2 (two) times  daily.     Family History       Problem Relation (Age of Onset)    Cancer Mother          Tobacco Use    Smoking status: Every Day     Types: Vaping with nicotine    Smokeless tobacco: Never   Substance and Sexual Activity    Alcohol use: Yes     Comment: occasional    Drug use: Never    Sexual activity: Yes     Partners: Female     Review of Systems   Constitutional:  Negative for activity change and appetite change.   HENT:  Negative for congestion, dental problem and drooling.    Eyes:  Negative for discharge and itching.   Respiratory:  Negative for apnea, cough, choking and chest tightness.    Cardiovascular:  Negative for chest pain, palpitations and leg swelling.   Gastrointestinal:  Positive for abdominal pain, nausea and vomiting.   Endocrine: Negative for cold intolerance and heat intolerance.   Genitourinary:  Negative for difficulty urinating, dyspareunia, dysuria, enuresis and flank pain.   Musculoskeletal:  Negative for arthralgias and back pain.   Skin:  Negative for color change and pallor.   Allergic/Immunologic: Negative for environmental allergies and food allergies.   Neurological:  Negative for dizziness, facial asymmetry, light-headedness and headaches.   Hematological:  Negative for adenopathy. Does not bruise/bleed easily.   Psychiatric/Behavioral:  Negative for agitation, behavioral problems and confusion.      Objective:     Vital Signs (Most Recent):  Temp: 97.3 °F (36.3 °C) (07/05/24 2000)  Pulse: 77 (07/06/24 0230)  Resp: (!) 22 (07/06/24 0230)  BP: (!) 166/92 (07/06/24 0230)  SpO2: 99 % (07/06/24 0230) Vital Signs (24h Range):  Temp:  [97.3 °F (36.3 °C)] 97.3 °F (36.3 °C)  Pulse:  [] 77  Resp:  [18-41] 22  SpO2:  [96 %-100 %] 99 %  BP: (138-180)/() 166/92     Weight: 70.3 kg (155 lb)  Body mass index is 24.28 kg/m².     Physical Exam  Constitutional:       General: She is not in acute distress.     Appearance: Normal appearance. She is not ill-appearing.   HENT:      Head:  Normocephalic and atraumatic.      Right Ear: There is no impacted cerumen.      Left Ear: There is no impacted cerumen.      Nose: No congestion or rhinorrhea.      Mouth/Throat:      Pharynx: No oropharyngeal exudate or posterior oropharyngeal erythema.   Cardiovascular:      Rate and Rhythm: Normal rate and regular rhythm.      Heart sounds: No murmur heard.     No friction rub.   Pulmonary:      Effort: Pulmonary effort is normal. No respiratory distress.      Breath sounds: Normal breath sounds. No stridor.   Abdominal:      General: There is no distension.      Palpations: Abdomen is soft. There is no mass.      Tenderness: There is abdominal tenderness. There is no rebound.      Hernia: No hernia is present.   Musculoskeletal:         General: No swelling or tenderness.   Skin:     Coloration: Skin is not jaundiced or pale.   Neurological:      General: No focal deficit present.      Mental Status: She is alert and oriented to person, place, and time. Mental status is at baseline.      Cranial Nerves: No cranial nerve deficit.      Sensory: No sensory deficit.                Significant Labs: All pertinent labs within the past 24 hours have been reviewed.  Bilirubin:   Recent Labs   Lab 07/05/24 2130   BILITOT 0.7       BMP:   Recent Labs   Lab 07/05/24 2130   *   *   K 5.6*   CL 93*   CO2 27   BUN 25*   CREATININE 2.0*   CALCIUM 10.6*     CBC:   Recent Labs   Lab 07/05/24 2130   WBC 19.15*   HGB 17.1*   HCT 49.7*        CMP:   Recent Labs   Lab 07/05/24 2130   *   K 5.6*   CL 93*   CO2 27   *   BUN 25*   CREATININE 2.0*   CALCIUM 10.6*   PROT 8.9*   ALBUMIN 5.4*   BILITOT 0.7   ALKPHOS 87   AST 59*   ALT 80*   ANIONGAP 12       Lipase:   Recent Labs   Lab 07/05/24 2130   LIPASE 153*     Troponin:   Recent Labs   Lab 07/05/24 2130 07/05/24  2334   TROPONINIHS 5.7 5.3     TSH:   Recent Labs   Lab 07/05/24 2130   TSH 20.309*       Significant Imaging: I have reviewed all  pertinent imaging results/findings within the past 24 hours.

## 2024-07-06 NOTE — ASSESSMENT & PLAN NOTE
Patient with acute kidney injury/acute renal failure likely due to pre-renal azotemia due to dehydration  Baseline creatinine unknown - Labs reviewed- Renal function/electrolytes with Estimated Creatinine Clearance: 32.7 mL/min (A) (based on SCr of 2 mg/dL (H)). according to latest data. Monitor urine output and serial BMP and adjust therapy as needed. Avoid nephrotoxins and renally dose meds for GFR listed above.  Continue to monitor  Patient received 1 L of IV fluids in the ER  Continue hydration at 100 mL/hour  If creatinine does not improve, may consider consulting Nephrology Service

## 2024-07-06 NOTE — HPI
This is a case of 49-year-old female with medical history of hypertension, ADHD, SVT on diltiazem.  Patient presented to the emergency department's with episodes of nausea and vomiting.  She said the episode started this morning.  She denied any other associated symptoms like fever, chest pain, chest tightness, or shortness of the breath.  She reported some abdominal pain in the center of her abdomen/epigastric radiating to the left side of her abdomen.  Nothing made it better nothing made it worse.  She received a GI cocktail in the emergency department's and she was found to have MAXINE.  Patient will be admitted to the hospital.

## 2024-07-06 NOTE — ASSESSMENT & PLAN NOTE
Patient also reported history of SVT she is on diltiazem and metoprolol  Hold losartan because of hyperkalemia

## 2024-07-06 NOTE — ASSESSMENT & PLAN NOTE
Elevated TSH, patient is also dehydrated.  May consider repeat TSH during this hospitalization when the patient gets more hydration.  Continue on home dose of levothyroxine.

## 2024-07-07 LAB
OHS QRS DURATION: 92 MS
OHS QTC CALCULATION: 436 MS

## 2024-07-09 LAB
HAV IGM SERPL QL IA: NEGATIVE
HBV CORE IGM SERPL QL IA: NEGATIVE
HBV SURFACE AG SERPL QL IA: NEGATIVE
HCV AB S/CO SERPL IA: NON REACTIVE
HCV AB SERPL QL IA: NORMAL

## 2024-07-11 LAB
BACTERIA BLD CULT: NORMAL
BACTERIA BLD CULT: NORMAL

## 2024-07-12 ENCOUNTER — PATIENT OUTREACH (OUTPATIENT)
Dept: ADMINISTRATIVE | Facility: CLINIC | Age: 49
End: 2024-07-12

## 2024-07-12 NOTE — PROGRESS NOTES
C3 nurse attempted to contact Efren Dove  for a TCC post hospital discharge follow up call. No answer. The patient does not have a scheduled HOSFU appointment, and the pt does not have an [facility] PCP.

## 2024-07-12 NOTE — PROGRESS NOTES
2nd Attempt made to reach patient for TCC call. Left voicemail please call [phone] leave first name, last name, and  Krys will return your call.